# Patient Record
Sex: MALE | Employment: OTHER | URBAN - METROPOLITAN AREA
[De-identification: names, ages, dates, MRNs, and addresses within clinical notes are randomized per-mention and may not be internally consistent; named-entity substitution may affect disease eponyms.]

---

## 2017-04-25 ENCOUNTER — OFFICE VISIT (OUTPATIENT)
Dept: FAMILY MEDICINE | Facility: CLINIC | Age: 62
End: 2017-04-25
Payer: COMMERCIAL

## 2017-04-25 DIAGNOSIS — Z85.828 HISTORY OF SKIN CANCER: ICD-10-CM

## 2017-04-25 DIAGNOSIS — L81.4 SOLAR LENTIGO: ICD-10-CM

## 2017-04-25 DIAGNOSIS — Z12.83 SKIN CANCER SCREENING: Primary | ICD-10-CM

## 2017-04-25 DIAGNOSIS — D22.9 MULTIPLE BENIGN MELANOCYTIC NEVI: ICD-10-CM

## 2017-04-25 DIAGNOSIS — L57.0 AK (ACTINIC KERATOSIS): ICD-10-CM

## 2017-04-25 PROCEDURE — 99213 OFFICE O/P EST LOW 20 MIN: CPT | Mod: 25 | Performed by: FAMILY MEDICINE

## 2017-04-25 PROCEDURE — 17000 DESTRUCT PREMALG LESION: CPT | Performed by: FAMILY MEDICINE

## 2017-04-25 PROCEDURE — 17003 DESTRUCT PREMALG LES 2-14: CPT | Performed by: FAMILY MEDICINE

## 2017-04-25 NOTE — MR AVS SNAPSHOT
"              After Visit Summary   4/25/2017    Jose Henderson    MRN: [id number]           Patient Information     Date Of Birth          1955        Visit Information        Provider Department      4/25/2017 12:20 PM Katrina Husain MD [facility]        Today's Diagnoses     Skin cancer screening    -  1    AK (actinic keratosis)        History of skin cancer        Solar lentigo        Multiple benign melanocytic nevi          Care Instructions    FUTURE APPOINTMENTS  Follow up every 1 year(s) for a full-body skin cancer screening.    TOPICAL STEROID INSTRUCTIONS  Hydrocortisone 1% cream.    Apply an amount equal to half of a fingertip (0.25 g) to the affected itchy area(s) on the insides of ears, two times per day for 3-5 days.    \"Fingertip Amount\"      This is a weak strength steroid, and it can be used on the ears.    Topical steroid use is for short-term treatment only. If after initial treatment, you are using this for prolonged periods of time, return to clinic for re-evaluation of treatment.    Keep in mind to also regularly use moisturizer, as this preventative measure can help maintain your skin's natural moisture barrier.    TIPS FOR AVOIDING SKIN CANCER AND PREMATURE SKIN AGING  DOs    Wear a wide-brimmed hat and sunglasses.     Wear sun-protective clothing.    [employer] and other [employer] make sun protective clothing that is stylish, comfortable and cool.    [employer]ets [employer] family [employer] and other [employer] make UV arm sleeves suitable for golfing, gardening and other activities.      Wear sunscreen on your face every day, even in the winter. (UVA \"aging rays\" penetrates window glass and is just as strong in the winter as in the summer) Sunscreen with SPF > 30 is recommended.    Wear sunscreen on your body and re-apply every 2 hours when exposed to sun. Sunscreen with SPF > 50 is recommended.    You should use about 3 tablespoons of sunscreen to protect your " "whole body. Thus a typical eight ounce bottle of sunscreen should last 4 applications. Remember, that the SPF rating is compromised if you don t apply enough. Most people only apply 1/2 - 1/3 of the amount they need. Also don t forget areas such as your ears, feet, upper back and harder to reach places. Keep in mind that these amounts should be increased for larger body sizes.    Note that spray sunscreens are only for touch-up application, not as a base layer. Also, use spray sunscreens with caution around small children due to inhalation risk.    Product Recommendations:    Look for broad spectrum sunscreen (blocks both UVA and UVB).    Look for titanium dioxide and/or zinc oxide in the active ingredients, which are physical blockers as opposed to chemical blockers. Chemical-free sunscreens should not irritate the skin.    Good examples include: Blue Lizard, EltaMD, Vanicream, Solbar, CeraVe.     For sensitive skin, consider : SkinMedica Essential Defense Mineral Shield Broad Spectrum SPF 35      Avoid combination products that include both sunscreen and insect repellant, as sunscreen should be applied every 2 hours, but insect repellant should not be applied as frequently.    Avoid products that include oxybenzone or retinyl palmitate.    For more information:  http://www.skincancer.org/prevention/sun-protection/sunscreen/sunscreens-safe-and-effective    DON'Ts    All tanning damages the skin. Aim for ivory skin year round and you will have less trouble with your skin in years to come. There is no merit in getting \"a base tan\" before a warm weather vacation, as any tanning indicates your body's response to sun damage.    Never use tanning beds. Tanning beds are associated with much higher risks of skin cancer.    Avoid mid-day sunshine (10 AM to 3 PM), if possible.    Stop smoking. Smokers have higher rates of skin cancer and also have premature skin wrinkling.    SKIN CANCER SELF-EXAM INSTRUCTIONS  Check every " month in the mirror or with a household member. Be aware of any changes, especially bleeding or tenderness. Also, make sure to check your nails for color changes after removal of nail polish.    For melanoma, check for:  A - Asymmetry. One half unlike the other half.  B - Border. Irregular, scalloped, ragged, notched, blurred or poorly defined borders.  C - Color. Color variations from one area to another, with shades of tan, brown and/or black present. Sometimes white, red or blue.  D - Diameter. Greater than 6 mm (about the size of a pencil eraser). Any new growth of a mole should be concerning and be evaluated.  E - Evolving. A mole or skin lesion that looks different from the rest or is changing in size, shape or color.    For basal cell carcinoma and squamous cell carcinoma, check for:    Sores, shiny bumps, nodules, scaly lesions, or wart-like growths that are itchy, tender, crusting, scabbing, eroding, oozing or bleeding.    Open sores/wounds or reddish/irritated areas that do not heal within 2-3 weeks.    Scar-like areas that are white, yellow or waxy in color.    CRYOTHERAPY FOR ACTINIC KERATOSES POST-TREATMENT CARE INSTRUCTIONS  Actinic keratoses are benign, scaly or gritty lesions that appear in sun-exposed areas and may progress to skin cancers. For this reason, it is important to treat them before they become cancerous.  Liquid nitrogen is mildly uncomfortable when applied to the skin, but the discomfort rapidly subsides.    Post-Treatment:  You may experience burning and/or stinging immediately following the procedure. The discomfort from the procedure may persist over the next 12-24 hours. The area treated will look pinker and slightly swollen before the healing process begins. You may also notice redness, swelling, tenderness, weeping and crusts or scabs.    Blister - You may or may notice blistering from the freezing. If you develop an uncomfortable blister from today's treatment, you may gently  puncture this with a needle that has been cleaned with alcohol. However, do not remove the protective skin layer of the blister.    Scab - After a few days, you may notice scaliness or scab formation. Do not pick at the scabs because this may cause slower healing and a permanent scar.    The skin may appear temporarily darker at the treatment site, but this usually fades over a period of months, provided that the area is protected from the sun.    Care of the areas treated:    Wash the area with a mild cleanser.    Gently pat dry.    Do not rub.     Keep protected from the sun during the healing process and for a full year following treatment as the skin continues to remodel during this time.    Apply Vaseline or Aquaphor ointment sparingly to the site for the first 7 days after treatment.    Do not use Neosporin, as many people eventually develop a medication allergy, that can easily be confused with an infection, to Neomycin.    Return if:  There should not be any residual scaling. If there is any concern that the lesion has persisted after 4-6 weeks, make an appointment for a re-check. Healing time does vary depending on your individual healing process and the area of the body treated. Most patients will be healed in one month.    Signs of Infection:  Thankfully this is rare. However if you notice persistent colored drainage, increasing pain, fever or other signs of infection, please call us at: [phone]        Follow-ups after your visit        Your next 10 appointments already scheduled     Apr 25, 2017 12:20 PM CDT   Office Visit with Katrina Husain MD   [facility] ([facility] )    [address] [place] [address] [id number] [phone]           Bring a current list of meds and any records pertaining to this visit.  For Physicals, please bring immunization records and any forms needing to be filled out.  Please arrive 10  "minutes early to complete paperwork.              Who to contact     If you have questions or need follow up information about today's clinic visit or your schedule please contact [facility] directly at [phone].  Normal or non-critical lab and imaging results will be communicated to you by MyChart, letter or phone within 4 business days after the clinic has received the results. If you do not hear from us within 7 days, please contact the clinic through MyChart or phone. If you have a critical or abnormal lab result, we will notify you by phone as soon as possible.  Submit refill requests through [employer] or call your pharmacy and they will forward the refill request to us. Please allow 3 business days for your refill to be completed.          Additional Information About Your Visit        [employer] Information     [employer] lets you send messages to your doctor, view your test results, renew your prescriptions, schedule appointments and more. To sign up, go to [email or web address] . Click on \"Log in\" on the left side of the screen, which will take you to the Welcome page. Then click on \"Sign up Now\" on the right side of the page.     You will be asked to enter the access code listed below, as well as some personal information. Please follow the directions to create your username and password.     Your access code is: [email or web address]  Expires: 2017 12:12 PM     Your access code will  in 90 days. If you need help or a new code, please call your [facility] clinic or [phone].        Care EveryWhere ID     This is your Care EveryWhere ID. This could be used by other organizations to access your [facility] medical records  [id number]         Blood Pressure from Last 3 Encounters:   No data found for BP    Weight from Last 3 Encounters:   No data found for Wt              Today, you had the following     No orders found for display       Primary Care Provider    None Specified       " No primary provider on file.        Thank you!     Thank you for choosing [facility]  for your care. Our goal is always to provide you with excellent care. Hearing back from our patients is one way we can continue to improve our services. Please take a few minutes to complete the written survey that you may receive in the mail after your visit with us. Thank you!             Your Updated Medication List - Protect others around you: Learn how to safely use, store and throw away your medicines at www.disposemymeds.org.      Notice  As of 4/25/2017 12:12 PM    You have not been prescribed any medications.

## 2017-04-25 NOTE — PATIENT INSTRUCTIONS
"FUTURE APPOINTMENTS  Follow up every 1 year(s) for a full-body skin cancer screening.    TOPICAL STEROID INSTRUCTIONS  Hydrocortisone 1% cream.    Apply an amount equal to half of a fingertip (0.25 g) to the affected itchy area(s) on the insides of ears, two times per day for 3-5 days.    \"Fingertip Amount\"      This is a weak strength steroid, and it can be used on the ears.    Topical steroid use is for short-term treatment only. If after initial treatment, you are using this for prolonged periods of time, return to clinic for re-evaluation of treatment.    Keep in mind to also regularly use moisturizer, as this preventative measure can help maintain your skin's natural moisture barrier.    TIPS FOR AVOIDING SKIN CANCER AND PREMATURE SKIN AGING  DOs    Wear a wide-brimmed hat and sunglasses.     Wear sun-protective clothing.    [employer] and other [employer] make sun protective clothing that is stylish, comfortable and cool.    [employer] and other [employer] make UV arm sleeves suitable for golfing, gardening and other activities.      Wear sunscreen on your face every day, even in the winter. (UVA \"aging rays\" penetrates window glass and is just as strong in the winter as in the summer) Sunscreen with SPF > 30 is recommended.    Wear sunscreen on your body and re-apply every 2 hours when exposed to sun. Sunscreen with SPF > 50 is recommended.    You should use about 3 tablespoons of sunscreen to protect your whole body. Thus a typical eight ounce bottle of sunscreen should last 4 applications. Remember, that the SPF rating is compromised if you don t apply enough. Most people only apply 1/2 - 1/3 of the amount they need. Also don t forget areas such as your ears, feet, upper back and harder to reach places. Keep in mind that these amounts should be increased for larger body sizes.    Note that spray sunscreens are only for touch-up application, not as a base layer. Also, use spray sunscreens with " "caution around small children due to inhalation risk.    Product Recommendations:    Look for broad spectrum sunscreen (blocks both UVA and UVB).    Look for titanium dioxide and/or zinc oxide in the active ingredients, which are physical blockers as opposed to chemical blockers. Chemical-free sunscreens should not irritate the skin.    Good examples include: Blue Lizard, EltaMD, Vanicream, Solbar, CeraVe.     For sensitive skin, consider : SkinMedica Essential Defense Mineral Shield Broad Spectrum SPF 35      Avoid combination products that include both sunscreen and insect repellant, as sunscreen should be applied every 2 hours, but insect repellant should not be applied as frequently.    Avoid products that include oxybenzone or retinyl palmitate.    For more information:  http://www.skincancer.org/prevention/sun-protection/sunscreen/sunscreens-safe-and-effective    DON'Ts    All tanning damages the skin. Aim for ivory skin year round and you will have less trouble with your skin in years to come. There is no merit in getting \"a base tan\" before a warm weather vacation, as any tanning indicates your body's response to sun damage.    Never use tanning beds. Tanning beds are associated with much higher risks of skin cancer.    Avoid mid-day sunshine (10 AM to 3 PM), if possible.    Stop smoking. Smokers have higher rates of skin cancer and also have premature skin wrinkling.    SKIN CANCER SELF-EXAM INSTRUCTIONS  Check every month in the mirror or with a household member. Be aware of any changes, especially bleeding or tenderness. Also, make sure to check your nails for color changes after removal of nail polish.    For melanoma, check for:  A - Asymmetry. One half unlike the other half.  B - Border. Irregular, scalloped, ragged, notched, blurred or poorly defined borders.  C - Color. Color variations from one area to another, with shades of tan, brown and/or black present. Sometimes white, red or blue.  D - " Diameter. Greater than 6 mm (about the size of a pencil eraser). Any new growth of a mole should be concerning and be evaluated.  E - Evolving. A mole or skin lesion that looks different from the rest or is changing in size, shape or color.    For basal cell carcinoma and squamous cell carcinoma, check for:    Sores, shiny bumps, nodules, scaly lesions, or wart-like growths that are itchy, tender, crusting, scabbing, eroding, oozing or bleeding.    Open sores/wounds or reddish/irritated areas that do not heal within 2-3 weeks.    Scar-like areas that are white, yellow or waxy in color.    CRYOTHERAPY FOR ACTINIC KERATOSES POST-TREATMENT CARE INSTRUCTIONS  Actinic keratoses are benign, scaly or gritty lesions that appear in sun-exposed areas and may progress to skin cancers. For this reason, it is important to treat them before they become cancerous.  Liquid nitrogen is mildly uncomfortable when applied to the skin, but the discomfort rapidly subsides.    Post-Treatment:  You may experience burning and/or stinging immediately following the procedure. The discomfort from the procedure may persist over the next 12-24 hours. The area treated will look pinker and slightly swollen before the healing process begins. You may also notice redness, swelling, tenderness, weeping and crusts or scabs.    Blister - You may or may notice blistering from the freezing. If you develop an uncomfortable blister from today's treatment, you may gently puncture this with a needle that has been cleaned with alcohol. However, do not remove the protective skin layer of the blister.    Scab - After a few days, you may notice scaliness or scab formation. Do not pick at the scabs because this may cause slower healing and a permanent scar.    The skin may appear temporarily darker at the treatment site, but this usually fades over a period of months, provided that the area is protected from the sun.    Care of the areas treated:    Wash the area  with a mild cleanser.    Gently pat dry.    Do not rub.     Keep protected from the sun during the healing process and for a full year following treatment as the skin continues to remodel during this time.    Apply Vaseline or Aquaphor ointment sparingly to the site for the first 7 days after treatment.    Do not use Neosporin, as many people eventually develop a medication allergy, that can easily be confused with an infection, to Neomycin.    Return if:  There should not be any residual scaling. If there is any concern that the lesion has persisted after 4-6 weeks, make an appointment for a re-check. Healing time does vary depending on your individual healing process and the area of the body treated. Most patients will be healed in one month.    Signs of Infection:  Thankfully this is rare. However if you notice persistent colored drainage, increasing pain, fever or other signs of infection, please call us at: [phone]

## 2017-04-25 NOTE — PROGRESS NOTES
[facility] - PRIMARY CARE SKIN    CC : skin cancer screening (full-body)  SUBJECTIVE:                                                    Jose Henderson is a 61 year old male who presents to clinic today for a full-body skin exam because of his history of skin cancer.    Bothersome lesions noticed by the patient or other skin concerns :  Issue One : lesion on temple areas of face.  Onset : within the last 4 years.  Enlarging : YES.  Bleeding : NO  Itchy or irritating : YES.  Pain or tenderness : NO.  Changing color : NO.  Issue Two : Lesion on nose.  Onset : 1 year.  Enlarging : YES.  Bleeding : NO  Itchy or irritating : NO.  Pain or tenderness : NO.  Changing color : NO.    Personal history of other skin cancer : YES - squamous cell carcinoma, other skin cancers on face.  Family history of skin cancer : NO.    Sun Exposure History  Previous history of significant sun exposure:  Blistering sunburns : YES - 1-2 times  Tanning beds : YES - when younger.  Sunscreen Use : YES.    Occupation : retired  (both indoor & outdoor).    Refer to electronic medical record (EMR) for past medical history and medications.    INTEGUMENTARY/SKIN: POSITIVE for changing lesions  ROS : 14 point review of systems was negative except the symptoms listed above in the HPI.    This document serves as a record of the services and decisions personally performed and made by Prachi Husain MD. It was created on her behalf by Ryland Connelly, a trained medical scribe.  The creation of this document is based on the scribe's personal observations and the provider's statements to the medical scribe.  Ryland Connelly, April 25, 2017 12:03 PM      OBJECTIVE:                                                    GENERAL: healthy, alert and no distress  SKIN: Betts Skin Type - II.  This patient was examined from the top of the head to the bottom of the feet  including scalp, face, neck, back, chest, breasts, buttocks, both arms, both legs,  both hands, both feet, all 10 fingers in all 10 toes. The dermatoscope was used to help evaluate pigmented lesions.  Skin Pertinent Findings:  Face : Scattered, brown, macule(s) most consistent with benign solar lentigo.    Chest, Abdomen : Multiple, slightly raised, red lesion(s) consistent with capillary hemangioma.    Back : Multiple, scattered, 2 mm - 6 mm in size, brown macules most consistent with benign nevi (melanocytic nevi).    Significant Findings:  Left lateral forehead : Well-healed scar.    Face : 5  mm in size, erythematous, scaly, non-indurated lesion(s) most consistent with actinic keratoses.  Name : Liquid Nitrogen Cry-Ac Cryotherapy.  Indication : Pre-malignant lesion.  Location(s) : left lateral cheek - x1, left lateral forehead - x1, right lateral face - x1.  Completed by : Prachi Husain MD  Note : Discussed natural history of lesion and treatment options. Prior to treatment, we discussed inflammation, tenderness post-procedure, the healing process, and the risks of pain, infection, scarring, blistering, and hypo-/hyperpigmentation after healing. Explained that these lesions may grow back and may need additional treatment or re-treatment. The patient expressed a desire to proceed with cryotherapy.    The lesion(s) was treated with liquid nitrogen Cry-Ac, five second freeze repeated twice with a pause to allow for the area to thaw. If this lesion should recur, then it needs to be re-evaluated.    Patient tolerated the procedure well and left in good condition.  Total number of lesions treated : 3.    Diagnostic Test Results:  none           ASSESSMENT:                                                      Encounter Diagnoses   Name Primary?     Skin cancer screening Yes     AK (actinic keratosis)      History of skin cancer      Solar lentigo      Multiple benign melanocytic nevi          PLAN:                                                    Patient Instructions   FUTURE APPOINTMENTS  Follow up  "every 1 year(s) for a full-body skin cancer screening.    TOPICAL STEROID INSTRUCTIONS  Hydrocortisone 1% cream.    Apply an amount equal to half of a fingertip (0.25 g) to the affected itchy area(s) on the insides of ears, two times per day for 3-5 days.    \"Fingertip Amount\"      This is a weak strength steroid, and it can be used on the ears.    Topical steroid use is for short-term treatment only. If after initial treatment, you are using this for prolonged periods of time, return to clinic for re-evaluation of treatment.    Keep in mind to also regularly use moisturizer, as this preventative measure can help maintain your skin's natural moisture barrier.    TIPS FOR AVOIDING SKIN CANCER AND PREMATURE SKIN AGING  DOs    Wear a wide-brimmed hat and sunglasses.     Wear sun-protective clothing.    [employer] and other [employer] make sun protective clothing that is stylish, comfortable and cool.    [employer] and other [employer] make UV arm sleeves suitable for golfing, gardening and other activities.      Wear sunscreen on your face every day, even in the winter. (UVA \"aging rays\" penetrates window glass and is just as strong in the winter as in the summer) Sunscreen with SPF > 30 is recommended.    Wear sunscreen on your body and re-apply every 2 hours when exposed to sun. Sunscreen with SPF > 50 is recommended.    You should use about 3 tablespoons of sunscreen to protect your whole body. Thus a typical eight ounce bottle of sunscreen should last 4 applications. Remember, that the SPF rating is compromised if you don t apply enough. Most people only apply 1/2 - 1/3 of the amount they need. Also don t forget areas such as your ears, feet, upper back and harder to reach places. Keep in mind that these amounts should be increased for larger body sizes.    Note that spray sunscreens are only for touch-up application, not as a base layer. Also, use spray sunscreens with caution around small children due " "to inhalation risk.    Product Recommendations:    Look for broad spectrum sunscreen (blocks both UVA and UVB).    Look for titanium dioxide and/or zinc oxide in the active ingredients, which are physical blockers as opposed to chemical blockers. Chemical-free sunscreens should not irritate the skin.    Good examples include: Blue Lizard, EltaMD, Vanicream, Solbar, CeraVe.     For sensitive skin, consider : Skin[employer] Essential Defense Mineral Shield Broad Spectrum SPF 35      Avoid combination products that include both sunscreen and insect repellant, as sunscreen should be applied every 2 hours, but insect repellant should not be applied as frequently.    Avoid products that include oxybenzone or retinyl palmitate.    For more information:  http://www.skincancer.org/prevention/sun-protection/sunscreen/sunscreens-safe-and-effective    DON'Ts    All tanning damages the skin. Aim for ivory skin year round and you will have less trouble with your skin in years to come. There is no merit in getting \"a base tan\" before a warm weather vacation, as any tanning indicates your body's response to sun damage.    Never use tanning beds. Tanning beds are associated with much higher risks of skin cancer.    Avoid mid-day sunshine (10 AM to 3 PM), if possible.    Stop smoking. Smokers have higher rates of skin cancer and also have premature skin wrinkling.    SKIN CANCER SELF-EXAM INSTRUCTIONS  Check every month in the mirror or with a household member. Be aware of any changes, especially bleeding or tenderness. Also, make sure to check your nails for color changes after removal of nail polish.    For melanoma, check for:  A - Asymmetry. One half unlike the other half.  B - Border. Irregular, scalloped, ragged, notched, blurred or poorly defined borders.  C - Color. Color variations from one area to another, with shades of tan, brown and/or black present. Sometimes white, red or blue.  D - Diameter. Greater than 6 mm (about the " size of a pencil eraser). Any new growth of a mole should be concerning and be evaluated.  E - Evolving. A mole or skin lesion that looks different from the rest or is changing in size, shape or color.    For basal cell carcinoma and squamous cell carcinoma, check for:    Sores, shiny bumps, nodules, scaly lesions, or wart-like growths that are itchy, tender, crusting, scabbing, eroding, oozing or bleeding.    Open sores/wounds or reddish/irritated areas that do not heal within 2-3 weeks.    Scar-like areas that are white, yellow or waxy in color.    CRYOTHERAPY FOR ACTINIC KERATOSES POST-TREATMENT CARE INSTRUCTIONS  Actinic keratoses are benign, scaly or gritty lesions that appear in sun-exposed areas and may progress to skin cancers. For this reason, it is important to treat them before they become cancerous.  Liquid nitrogen is mildly uncomfortable when applied to the skin, but the discomfort rapidly subsides.    Post-Treatment:  You may experience burning and/or stinging immediately following the procedure. The discomfort from the procedure may persist over the next 12-24 hours. The area treated will look pinker and slightly swollen before the healing process begins. You may also notice redness, swelling, tenderness, weeping and crusts or scabs.    Blister - You may or may notice blistering from the freezing. If you develop an uncomfortable blister from today's treatment, you may gently puncture this with a needle that has been cleaned with alcohol. However, do not remove the protective skin layer of the blister.    Scab - After a few days, you may notice scaliness or scab formation. Do not pick at the scabs because this may cause slower healing and a permanent scar.    The skin may appear temporarily darker at the treatment site, but this usually fades over a period of months, provided that the area is protected from the sun.    Care of the areas treated:    Wash the area with a mild cleanser.    Gently pat  dry.    Do not rub.     Keep protected from the sun during the healing process and for a full year following treatment as the skin continues to remodel during this time.    Apply Vaseline or Aquaphor ointment sparingly to the site for the first 7 days after treatment.    Do not use Neosporin, as many people eventually develop a medication allergy, that can easily be confused with an infection, to Neomycin.    Return if:  There should not be any residual scaling. If there is any concern that the lesion has persisted after 4-6 weeks, make an appointment for a re-check. Healing time does vary depending on your individual healing process and the area of the body treated. Most patients will be healed in one month.    Signs of Infection:  Thankfully this is rare. However if you notice persistent colored drainage, increasing pain, fever or other signs of infection, please call us at: [phone]    The patient was counseled about sunscreens and sun avoidance. The patient was counseled to check the skin regularly and report any lesion that is new, changing, itching, scabbing, bleeding or otherwise bothersome. The patient was discharged ambulatory and in stable condition.    The patient was instructed to schedule full-body skin exam for skin cancer every 1 year.      PROCEDURES:                                                    None.    TT : 25 minutes.  CT : 15 minutes.      The information in this document, created by the medical scribe for me, accurately reflects the services I personally performed and the decisions made by me. I have reviewed and approved this document for accuracy prior to leaving the patient care area.  Prachi Husain MD April 25, 2017 12:03 PM  [facility] - PRIMARY CARE SKIN

## 2019-05-07 ENCOUNTER — OFFICE VISIT (OUTPATIENT)
Dept: FAMILY MEDICINE | Facility: CLINIC | Age: 64
End: 2019-05-07
Payer: COMMERCIAL

## 2019-05-07 VITALS — SYSTOLIC BLOOD PRESSURE: 128 MMHG | DIASTOLIC BLOOD PRESSURE: 62 MMHG

## 2019-05-07 DIAGNOSIS — Z85.828 HISTORY OF SKIN CANCER: ICD-10-CM

## 2019-05-07 DIAGNOSIS — D22.9 MULTIPLE BENIGN MELANOCYTIC NEVI: ICD-10-CM

## 2019-05-07 DIAGNOSIS — Z12.83 SKIN CANCER SCREENING: Primary | ICD-10-CM

## 2019-05-07 DIAGNOSIS — D48.5 NEOPLASM OF UNCERTAIN BEHAVIOR OF SKIN: ICD-10-CM

## 2019-05-07 DIAGNOSIS — L82.1 SEBORRHEIC KERATOSES: ICD-10-CM

## 2019-05-07 DIAGNOSIS — L81.4 SOLAR LENTIGO: ICD-10-CM

## 2019-05-07 DIAGNOSIS — L57.0 AK (ACTINIC KERATOSIS): ICD-10-CM

## 2019-05-07 PROCEDURE — 17000 DESTRUCT PREMALG LESION: CPT | Mod: 59 | Performed by: FAMILY MEDICINE

## 2019-05-07 PROCEDURE — 11301 SHAVE SKIN LESION 0.6-1.0 CM: CPT | Performed by: FAMILY MEDICINE

## 2019-05-07 PROCEDURE — 11301 SHAVE SKIN LESION 0.6-1.0 CM: CPT | Mod: 59 | Performed by: FAMILY MEDICINE

## 2019-05-07 PROCEDURE — 11300 SHAVE SKIN LESION 0.5 CM/<: CPT | Mod: 59 | Performed by: FAMILY MEDICINE

## 2019-05-07 PROCEDURE — 99214 OFFICE O/P EST MOD 30 MIN: CPT | Mod: 25 | Performed by: FAMILY MEDICINE

## 2019-05-07 PROCEDURE — 88342 IMHCHEM/IMCYTCHM 1ST ANTB: CPT | Mod: TC | Performed by: FAMILY MEDICINE

## 2019-05-07 PROCEDURE — 11300 SHAVE SKIN LESION 0.5 CM/<: CPT | Mod: 51 | Performed by: FAMILY MEDICINE

## 2019-05-07 PROCEDURE — 88305 TISSUE EXAM BY PATHOLOGIST: CPT | Mod: TC | Performed by: FAMILY MEDICINE

## 2019-05-07 RX ORDER — GABAPENTIN 300 MG/1
CAPSULE ORAL
COMMUNITY
Start: 2019-04-22

## 2019-05-07 RX ORDER — CIPROFLOXACIN AND DEXAMETHASONE 3; 1 MG/ML; MG/ML
SUSPENSION/ DROPS AURICULAR (OTIC)
COMMUNITY
Start: 2019-04-22

## 2019-05-07 RX ORDER — SILDENAFIL 100 MG/1
TABLET, FILM COATED ORAL
COMMUNITY
Start: 2019-01-22

## 2019-05-07 RX ORDER — SERTRALINE HYDROCHLORIDE 100 MG/1
TABLET, FILM COATED ORAL
COMMUNITY
Start: 2019-04-02

## 2019-05-07 RX ORDER — LISINOPRIL 10 MG/1
TABLET ORAL
COMMUNITY
Start: 2019-04-29

## 2019-05-07 RX ORDER — GLIMEPIRIDE 4 MG/1
TABLET ORAL
COMMUNITY
Start: 2019-03-08

## 2019-05-07 RX ORDER — ATORVASTATIN CALCIUM 80 MG/1
TABLET, FILM COATED ORAL
COMMUNITY
Start: 2019-04-29

## 2019-05-07 NOTE — LETTER
5/7/2019         RE: Jose Henderson  [address] [facility] [address]        Dear Colleague,    Thank you for referring your patient, Jose Henderson, to the [facility]. Please see a copy of my visit note below.    [facility] - PRIMARY CARE SKIN    CC: skin cancer screening (full-body)  SUBJECTIVE:   Jose Henderson is a(n) 64 year old male who presents to clinic today for a full-body skin exam.    Bothersome lesions noticed by the patient or other skin concerns :  Issue One: His primary care provider has noticed an abnormally colored and sized lesion on the back.    Issue Two: A brown spot on the left face becomes scaly and itchy.    Personal Medical History  Skin cancer: YES - squamous cell carcinoma, other skin cancers on face    Family Medical History  Skin cancer: NO    Sun Exposure History  Previous history of significant sun exposure:  Blistering sunburns: YES - 1-2 times.  Tanning beds: YES - when younger.  Sunscreen usage: YES.    Occupation: retired,  (both indoor & outdoor).    Refer to electronic medical record (EMR) for past medical history and medications.    INTEGUMENTARY/SKIN: POSITIVE for changing lesion  ROS: 14 point review of systems was negative except the symptoms listed above in the HPI.    This document serves as a record of the services and decisions personally performed and made by Katrina Husain MD and was created by Ryland Connelly, a trained medical scribe, based on personal observations and provider statements to the medical scribe.  May 7, 2019 10:57 AM   Ryland Connelly    OBJECTIVE:   GENERAL: healthy, alert and no distress.  LYMPH: no cervical, supraclavicular, axillary, or inguinal adenopathy  SKIN: Betts Skin Type - II.  This patient was examined from the top of the head to the bottom of the feet  including scalp, face, neck, trunk, buttocks, both arms, both legs, both hands, both feet, and all fingers and toes. The  dermatoscope was used to help evaluate pigmented lesions.  Skin Pertinent Findings:  Scalp, vertex: Two 4 mm in size smooth flesh-colored flat-topped benign-appearing lesion.    Left ala nasi and left mid-cheek: brown, macule(s) most consistent with benign solar lentigo.    Upper extremities, Trunk: Multiple brown macules of various sizes and shapes most consistent with (benign) melanocytic nevi.    Abdomen: slightly raised, red lesion(s) consistent with capillary hemangioma. stuck-on appearing papules, raised, brown, coarse-textured, round lesion(s) most consistent with seborrheic keratoses.    Significant Findings:  Right shoulder, 5 cm lateral to the AC joint: 4 mm in size irregularly pigmented brown lesion. ? Atypical nevus ? Other     Left lateral abdomen: 6 mm x 3 mm in size irregularly pigmented brown macule. ? Atypical nevus ? Other    Back, 5 cm right of L1: 3 mm in size irregular darkly pigmented lesion. ? Atypical nevus ? Other     Back, at T10, 15 mm left of midline: 9 mm x 5 mm in size flesh-colored slightly raised lesion with surrounding irregular pigmentation. ? Atypical nevus ? Other    Left mid-cheek: 8  mm in size, erythematous, scaly, non-indurated lesion(s) most consistent with actinic keratoses.  Name: Liquid Nitrogen Cry-Ac Cryotherapy.  Indication: Pre-malignant lesion.  Location(s): left mid-cheek - x1.  Completed by: Prachi Husain MD.  Note : Discussed natural history of lesion and treatment options. Prior to treatment, we discussed inflammation, tenderness post-procedure, the healing process, and the risks of pain, infection, scarring, blistering, and hypo-/hyperpigmentation after healing. Explained that these lesions may grow back and may need additional treatment or re-treatment. The patient expressed a desire to proceed with cryotherapy.    The lesion(s) was treated with liquid nitrogen Cry-Ac, five second freeze repeated twice with a pause to allow for the area to thaw.    Patient  "tolerated the procedure well and left in good condition. If this lesion should persist or recur, then it needs to be re-evaluated.  Total number of lesions treated: 1.    ASSESSMENT:     Encounter Diagnoses   Name Primary?     Skin cancer screening Yes     History of skin cancer      Solar lentigo      AK (actinic keratosis)      Neoplasm of uncertain behavior of skin      Seborrheic keratoses      Multiple benign melanocytic nevi          PLAN:   Patient Instructions   FUTURE APPOINTMENTS  Follow up in 1 year(s) for a full-body skin cancer screening.  Follow up per pathology report.    WOUND CARE INSTRUCTIONS  1. Wash hands before every dressing change.  2. After 24 hours, change dressing daily.  3. Wash the wound area with a mild soap, then rinse.  4. Gently pat dry with a sterile gauze or Q-tip.  5. Using a Q-tip, apply Vaseline or Aquaphor only over entire wound. Do NOT use Neosporin - as many people react to neomycin.  6. Finally, cover with a bandage or sterile non-stick gauze with micropore paper tape.  7. Repeat once daily until wound has healed.      Soap, water and shampoo will not hurt this area.    Do not go swimming or take baths, but showering is encouraged.    Limit use of the area where the procedure was done for a few days to allow for optimal healing.    If you experience bleeding:  Wash hands and hold firm pressure on the area for 10 minutes without checking to see if the bleeding has stopped. \"Checking\" pulls off the protective wound clot and restarts the bleeding all over again. Re-apply pressure for 10 minutes if necessary to stop bleeding.  Use additional sterile gauze and tape to maintain pressure once bleeding has stopped.  If bleeding continues, then call back to clinic at [phone].    Signs of Infection:  Infection can occur in any area where skin has been disrupted.  If you notice persistent redness, swelling, colored drainage, increasing pain, fever or other signs of infection, " please call us at: [phone] and ask to have me or my colleague paged. We will call you back to discuss.    Pathology Results:  You will be notified, generally via letter or MyChart, in approximately 10 days. If there is anything we need to discuss or further treatment needed, I will call you to discuss it.    PATIENT INFORMATION : WOUNDS  During the healing process you will notice a number of changes. All wounds develop a small halo of redness surrounding the wound.  This means healing is occurring. Severe itching with extensive redness usually indicates sensitivity to the ointment or bandage tape used to dress the wound.  You should call our office if this develops.      Swelling  and/or discoloration around your surgical site is common, particularly when performed around the eye.    All wounds normally drain.  The larger the wound the more drainage there will be.  After 7-10 days, you will notice the wound beginning to shrink and new skin will begin to grow.  The wound is healed when you can see skin has formed over the entire area.  A healed wound has a healthy, shiny look to the surface and is red to dark pink in color to normalize.  Wounds may take approximately 4-6 weeks to heal.  Larger wounds may take 6-8 weeks. After the wound is healed you may discontinue dressing changes.    You may experience a sensation of tightness as your wound heals. This is normal and will gradually subside.    Your healed wound may be sensitive to temperature changes. This sensitivity improves with time, but if you re having a lot of discomfort, try to avoid temperature extremes.    Patients frequently experience itching after their wound appears to have healed because of the continue healing under the skin.  Plain Vaseline will help relieve the itching.    ACTINIC KERATOSES POST-TREATMENT CARE INSTRUCTIONS  Actinic keratoses are benign, scaly or gritty lesions that appear in sun-exposed areas and may progress to skin  cancers. For this reason, it is important to treat them before they become cancerous. Liquid nitrogen is the most commonly used and most effective treatment for actinic keratoses; it is mildly uncomfortable when applied to the skin, but the discomfort rapidly subsides.    Post-Treatment:  You may experience burning and/or stinging immediately following the procedure. The discomfort from the procedure may persist over the next 12-24 hours. The area treated will look pinker and slightly swollen before the healing process begins. You may also notice redness, swelling, tenderness, weeping and crusts or scabs. Healing time is approximately 10-14 days.    Blister - You may or may notice blistering from the freezing. If you develop an uncomfortable blister from today's treatment, you may gently puncture this with a needle that has been cleaned with alcohol. However, do not remove the protective skin layer of the blister.    Scab - After a few days, you may notice scaliness or scab formation. Do not pick at the scabs because this may cause slower healing and a permanent scar.    The skin may appear temporarily darker at the treatment site, but this usually fades over a period of months, provided that the area is protected from the sun.    Care of the areas treated:    Wash the area with a mild cleanser.    Gently pat dry.    Do not rub.     Keep protected from the sun during the healing process and for a full year following treatment as the skin continues to remodel during this time.    Apply Vaseline or Aquaphor ointment sparingly to the site for the first 7 days after treatment.    Do not use Neosporin, as many people eventually develop a medication allergy, that can easily be confused with an infection, to Neomycin.    Return if:  There should not be any residual scaling. If there is any concern that the lesion has persisted after 4-6 weeks, make an appointment for a re-check. Healing time does vary depending on your  "individual healing process and the area of the body treated. Most patients will be healed in one month.    Signs of Infection:  Thankfully this is rare. However if you notice persistent colored drainage, increasing pain, fever or other signs of infection, please call us at: [phone]    SUN PROTECTION INSTRUCTIONS  Sun damage can lead to skin cancer and premature aging of the skin.      The best way to protect from sun damage to your skin is to avoid the sun during peak hours (10 am - 2 pm) even on overcast days.    Never use tanning beds. Tanning beds are associated with much higher risks of skin cancer.    All tanning damages the skin. Aim for ivory skin year round and you will have less trouble with your skin in years to come. There is no merit in getting \"a base tan\" before a warm weather vacation, as any tanning indicates your body's response to sun damage.    Stop smoking. Smokers have higher rates of skin cancer and also have premature skin wrinkling.    Use UPF sun-protective clothing, which while more expensive initially provides longer lasting coverage without having to worry about remembering to re-apply.  1. Wear a wide-brimmed hat and sunglasses.   2. Wear sun-protective clothing.  [employer]Rocket and other [employer] make sun protective clothing that are stylish, comfortable and cool.   KoolSpan and other [employer] make UV arm sleeves suitable for golfing, gardening and other activities.    Sunscreen instructions:  1. Use sunscreens with Zinc Oxide, Titanium Dioxide or Avobenzone to protect from UVA rays.  2. Use SPF 30-50+ to protect from UVB rays.  3. Re-apply every 2 hours even if water resistant.  4. Apply on your face every day even when cloudy and even in the winter. UVA \"aging rays\" penetrate window glass and are just as strong in the winter as in the summer.    FYI  You should use about 3 tablespoons of sunscreen to protect your whole body. Thus a typical eight ounce bottle of " sunscreen should last 4 applications. Remember, that the SPF rating is compromised if you don t apply enough. Most people only apply 1/2 - 1/3 of the amount they need. Also don t forget areas such as your ears, feet, upper back and harder to reach places. Keep in mind that these amounts should be increased for larger body sizes.    Sunscreens with titanium dioxide and/or zinc oxide in the active ingredients are physical blockers as opposed to chemical blockers. Chemical-free sunscreens should not irritate the skin.    Spray-on sunscreens may be used for touch-up application only, not as a base layer. Also, use with caution around small children due to inhalation risk.    SPF means sun protection factor, which is just the degree to which the sunscreen can protect against UVB rays. There is no rating system for UVA rays. SPF is calculated as the time skin will burn when sunscreen is applied vs. skin without sunscreen.    Water resistant sunscreens should be re-applied every 1-2 hours.    Product Recommendations:    Consider use of sunscreen sticks with Zinc Oxide and Titanium Dioxide active ingredients such as Neutrogena Pure&Free Baby Sunscreen Stick.    Good examples include: Blue Lizard, EltaMD, Solbar    Good daily moisturizers with SPF: Vanicream, CeraVe.    For sensitive skin, consider : SkinMedica Essential Defense Mineral Shield Broad Spectrum SPF 35    Men: consider use of Neutrogena Triple Protect Facial Lotion    Avoid retinyl palmitate products.  Avoid combination products that include both sunscreen and insect repellant, as sunscreen should be applied every 2 hours, but insect repellant should not be applied as frequently.    For more information:  https://www.skincancer.org/prevention/sun-protection/sunscreen/sunscreens-safe-and-effective    SKIN CANCER SELF-EXAM INSTRUCTIONS  Check every month in the mirror or with a household member. Be aware of any changes, especially bleeding or tenderness. Also,  make sure to check your nails for color changes after removal of nail polish.    For melanoma, check for:  A - Asymmetry. One half unlike the other half.  B - Border. Irregular, scalloped, ragged, notched, blurred or poorly defined borders.  C - Color. Color variations from one area to another, with shades of tan, brown and/or black present. Sometimes white, red or blue.  D - Diameter. Greater than 6 mm (about the size of a pencil eraser). Any new growth of a mole should be concerning and be evaluated.  E - Evolving. A mole or skin lesion that looks different from the rest or is changing in size, shape or color.    For basal cell carcinoma and squamous cell carcinoma, check for:    Sores, shiny bumps, nodules, scaly lesions, or wart-like growths that are itchy, tender, crusting, scabbing, eroding, oozing or bleeding.    Open sores/wounds or reddish/irritated areas that do not heal within 2-3 weeks.    Scar-like areas that are white, yellow or waxy in color.    Use OTC hydrocortisone 1% cream as needed for the area of scaling and itchiness on the face.    The patient was counseled about sunscreens and sun avoidance. The patient was counseled to check the skin regularly and report any lesion that is new, changing, itching, scabbing, bleeding or otherwise bothersome. The patient was discharged ambulatory and in stable condition.    TT: 25 minutes.  CT: 15 minutes.    The information in this document, created by the medical scribe for me, accurately reflects the services I personally performed and the decisions made by me. I have reviewed and approved this document for accuracy prior to leaving the patient care area.  May 7, 2019 10:57 AM  Katrina Husain MD  [facility]    Again, thank you for allowing me to participate in the care of your patient.        Sincerely,        Katrina Husain MD

## 2019-05-07 NOTE — PROCEDURES
Name : Shave Excision  Indication : Excision of tissue for pathology evaluation.  Location(s) : Right shoulder, 5 cm lateral to the AC joint: 4 mm in size irregularly pigmented brown lesion. ? Atypical nevus ? Other .  Completed by : Prachi Husain MD  Photo Taken : no.  Anesthesia : Patient was anesthetized by infiltrating the area surrounding the lesion with 1% lidocaine.   epinephrine [id number] : Yes.  Note : Discussed the risk of pain, infection, scarring, hypo- or hyperpigmentation and recurrence or need for re-treatment. The benefits of treatment and alternative treatments were also discussed.    During this procedure, the universal protocol was utilized. The patient's identity was confirmed by no less than two patient identifiers, correct procedure was verified, correct site was verified and marked as applicable and a final pause was completed.    Sterile technique was used throughout the procedure. The skin was cleaned and prepped with surgical cleanser. Once adequate anesthesia was obtained, the lesion was removed with a deep scallop shave procedure. The specimen was sent to pathology.    Direct pressure and aluminum chloride and monopolar cautery was applied for hemostasis. No bleeding was present upon the completion of the procedure. The wound was coated with antibacterial ointment. A dry sterile dressing was applied. Patient tolerated the procedure well and left in satisfactory condition.    Primary provider and referring provider will be informed regarding the tissue report when it returns.    Name : Shave Excision  Indication : Excision of tissue for pathology evaluation.  Location(s) : Left lateral abdomen: 6 mm x 3 mm in size irregularly pigmented brown macule. ? Atypical nevus ? Other.  Completed by : Prachi Husain MD  Photo Taken : no.  Anesthesia : Patient was anesthetized by infiltrating the area surrounding the lesion with 1% lidocaine.   epinephrine [id number] : Yes.  Note : Discussed the risk of  pain, infection, scarring, hypo- or hyperpigmentation and recurrence or need for re-treatment. The benefits of treatment and alternative treatments were also discussed.    During this procedure, the universal protocol was utilized. The patient's identity was confirmed by no less than two patient identifiers, correct procedure was verified, correct site was verified and marked as applicable and a final pause was completed.    Sterile technique was used throughout the procedure. The skin was cleaned and prepped with surgical cleanser. Once adequate anesthesia was obtained, the lesion was removed with a deep scallop shave procedure. The specimen was sent to pathology.    Direct pressure and aluminum chloride and monopolar cautery was applied for hemostasis. No bleeding was present upon the completion of the procedure. The wound was coated with antibacterial ointment. A dry sterile dressing was applied. Patient tolerated the procedure well and left in satisfactory condition.    Primary provider and referring provider will be informed regarding the tissue report when it returns.    Name : Shave Excision  Indication : Excision of tissue for pathology evaluation.  Location(s) : Back, 5 cm right of L1: 3 mm in size irregular darkly pigmented lesion. ? Atypical nevus ? Other.  Completed by : Prachi Husain MD  Photo Taken : no.  Anesthesia : Patient was anesthetized by infiltrating the area surrounding the lesion with 1% lidocaine.   epinephrine [id number] : Yes.  Note : Discussed the risk of pain, infection, scarring, hypo- or hyperpigmentation and recurrence or need for re-treatment. The benefits of treatment and alternative treatments were also discussed.    During this procedure, the universal protocol was utilized. The patient's identity was confirmed by no less than two patient identifiers, correct procedure was verified, correct site was verified and marked as applicable and a final pause was completed.    Sterile  technique was used throughout the procedure. The skin was cleaned and prepped with surgical cleanser. Once adequate anesthesia was obtained, the lesion was removed with a deep scallop shave procedure. The specimen was sent to pathology.    Direct pressure and aluminum chloride and monopolar cautery was applied for hemostasis. No bleeding was present upon the completion of the procedure. The wound was coated with antibacterial ointment. A dry sterile dressing was applied. Patient tolerated the procedure well and left in satisfactory condition.    Primary provider and referring provider will be informed regarding the tissue report when it returns.    Name : Shave Excision  Indication : Excision of tissue for pathology evaluation.  Location(s) : Back, at T10, 15 mm left of midline: 9 mm x 5 mm in size flesh-colored slightly raised lesion with surrounding irregular pigmentation. ? Atypical nevus ? Other.  Completed by : Prachi Husain MD  Photo Taken : no.  Anesthesia : Patient was anesthetized by infiltrating the area surrounding the lesion with 1% lidocaine.   epinephrine [id number] : Yes.  Note : Discussed the risk of pain, infection, scarring, hypo- or hyperpigmentation and recurrence or need for re-treatment. The benefits of treatment and alternative treatments were also discussed.    During this procedure, the universal protocol was utilized. The patient's identity was confirmed by no less than two patient identifiers, correct procedure was verified, correct site was verified and marked as applicable and a final pause was completed.    Sterile technique was used throughout the procedure. The skin was cleaned and prepped with surgical cleanser. Once adequate anesthesia was obtained, the lesion was removed with a deep scallop shave procedure. The specimen was sent to pathology.    Direct pressure and aluminum chloride and monopolar cautery was applied for hemostasis. No bleeding was present upon the completion of the  procedure. The wound was coated with antibacterial ointment. A dry sterile dressing was applied. Patient tolerated the procedure well and left in satisfactory condition.    Primary provider and referring provider will be informed regarding the tissue report when it returns.

## 2019-05-07 NOTE — PATIENT INSTRUCTIONS
"FUTURE APPOINTMENTS  Follow up in 1 year(s) for a full-body skin cancer screening.  Follow up per pathology report.    WOUND CARE INSTRUCTIONS  1. Wash hands before every dressing change.  2. After 24 hours, change dressing daily.  3. Wash the wound area with a mild soap, then rinse.  4. Gently pat dry with a sterile gauze or Q-tip.  5. Using a Q-tip, apply Vaseline or Aquaphor only over entire wound. Do NOT use Neosporin - as many people react to neomycin.  6. Finally, cover with a bandage or sterile non-stick gauze with micropore paper tape.  7. Repeat once daily until wound has healed.      Soap, water and shampoo will not hurt this area.    Do not go swimming or take baths, but showering is encouraged.    Limit use of the area where the procedure was done for a few days to allow for optimal healing.    If you experience bleeding:  Wash hands and hold firm pressure on the area for 10 minutes without checking to see if the bleeding has stopped. \"Checking\" pulls off the protective wound clot and restarts the bleeding all over again. Re-apply pressure for 10 minutes if necessary to stop bleeding.  Use additional sterile gauze and tape to maintain pressure once bleeding has stopped.  If bleeding continues, then call back to clinic at [phone].    Signs of Infection:  Infection can occur in any area where skin has been disrupted.  If you notice persistent redness, swelling, colored drainage, increasing pain, fever or other signs of infection, please call us at: [phone] and ask to have me or my colleague paged. We will call you back to discuss.    Pathology Results:  You will be notified, generally via letter or MyChart, in approximately 10 days. If there is anything we need to discuss or further treatment needed, I will call you to discuss it.    PATIENT INFORMATION : WOUNDS  During the healing process you will notice a number of changes. All wounds develop a small halo of redness surrounding the wound.  " This means healing is occurring. Severe itching with extensive redness usually indicates sensitivity to the ointment or bandage tape used to dress the wound.  You should call our office if this develops.      Swelling  and/or discoloration around your surgical site is common, particularly when performed around the eye.    All wounds normally drain.  The larger the wound the more drainage there will be.  After 7-10 days, you will notice the wound beginning to shrink and new skin will begin to grow.  The wound is healed when you can see skin has formed over the entire area.  A healed wound has a healthy, shiny look to the surface and is red to dark pink in color to normalize.  Wounds may take approximately 4-6 weeks to heal.  Larger wounds may take 6-8 weeks. After the wound is healed you may discontinue dressing changes.    You may experience a sensation of tightness as your wound heals. This is normal and will gradually subside.    Your healed wound may be sensitive to temperature changes. This sensitivity improves with time, but if you re having a lot of discomfort, try to avoid temperature extremes.    Patients frequently experience itching after their wound appears to have healed because of the continue healing under the skin.  Plain Vaseline will help relieve the itching.    ACTINIC KERATOSES POST-TREATMENT CARE INSTRUCTIONS  Actinic keratoses are benign, scaly or gritty lesions that appear in sun-exposed areas and may progress to skin cancers. For this reason, it is important to treat them before they become cancerous. Liquid nitrogen is the most commonly used and most effective treatment for actinic keratoses; it is mildly uncomfortable when applied to the skin, but the discomfort rapidly subsides.    Post-Treatment:  You may experience burning and/or stinging immediately following the procedure. The discomfort from the procedure may persist over the next 12-24 hours. The area treated will look pinker and  slightly swollen before the healing process begins. You may also notice redness, swelling, tenderness, weeping and crusts or scabs. Healing time is approximately 10-14 days.    Blister - You may or may notice blistering from the freezing. If you develop an uncomfortable blister from today's treatment, you may gently puncture this with a needle that has been cleaned with alcohol. However, do not remove the protective skin layer of the blister.    Scab - After a few days, you may notice scaliness or scab formation. Do not pick at the scabs because this may cause slower healing and a permanent scar.    The skin may appear temporarily darker at the treatment site, but this usually fades over a period of months, provided that the area is protected from the sun.    Care of the areas treated:    Wash the area with a mild cleanser.    Gently pat dry.    Do not rub.     Keep protected from the sun during the healing process and for a full year following treatment as the skin continues to remodel during this time.    Apply Vaseline or Aquaphor ointment sparingly to the site for the first 7 days after treatment.    Do not use Neosporin, as many people eventually develop a medication allergy, that can easily be confused with an infection, to Neomycin.    Return if:  There should not be any residual scaling. If there is any concern that the lesion has persisted after 4-6 weeks, make an appointment for a re-check. Healing time does vary depending on your individual healing process and the area of the body treated. Most patients will be healed in one month.    Signs of Infection:  Thankfully this is rare. However if you notice persistent colored drainage, increasing pain, fever or other signs of infection, please call us at: [phone]    SUN PROTECTION INSTRUCTIONS  Sun damage can lead to skin cancer and premature aging of the skin.      The best way to protect from sun damage to your skin is to avoid the sun during peak  "hours (10 am - 2 pm) even on overcast days.    Never use tanning beds. Tanning beds are associated with much higher risks of skin cancer.    All tanning damages the skin. Aim for ivory skin year round and you will have less trouble with your skin in years to come. There is no merit in getting \"a base tan\" before a warm weather vacation, as any tanning indicates your body's response to sun damage.    Stop smoking. Smokers have higher rates of skin cancer and also have premature skin wrinkling.    Use UPF sun-protective clothing, which while more expensive initially provides longer lasting coverage without having to worry about remembering to re-apply.  1. Wear a wide-brimmed hat and sunglasses.   2. Wear sun-protective clothing.  [employer] and other [employer] make sun protective clothing that are stylish, comfortable and cool.   [employer] other [employer] make UV arm sleeves suitable for golfing, gardening and other activities.    Sunscreen instructions:  1. Use sunscreens with Zinc Oxide, Titanium Dioxide or Avobenzone to protect from UVA rays.  2. Use SPF 30-50+ to protect from UVB rays.  3. Re-apply every 2 hours even if water resistant.  4. Apply on your face every day even when cloudy and even in the winter. UVA \"aging rays\" penetrate window glass and are just as strong in the winter as in the summer.    FYI  You should use about 3 tablespoons of sunscreen to protect your whole body. Thus a typical eight ounce bottle of sunscreen should last 4 applications. Remember, that the SPF rating is compromised if you don t apply enough. Most people only apply 1/2 - 1/3 of the amount they need. Also don t forget areas such as your ears, feet, upper back and harder to reach places. Keep in mind that these amounts should be increased for larger body sizes.    Sunscreens with titanium dioxide and/or zinc oxide in the active ingredients are physical blockers as opposed to chemical blockers. Chemical-free " sunscreens should not irritate the skin.    Spray-on sunscreens may be used for touch-up application only, not as a base layer. Also, use with caution around small children due to inhalation risk.    SPF means sun protection factor, which is just the degree to which the sunscreen can protect against UVB rays. There is no rating system for UVA rays. SPF is calculated as the time skin will burn when sunscreen is applied vs. skin without sunscreen.    Water resistant sunscreens should be re-applied every 1-2 hours.    Product Recommendations:    Consider use of sunscreen sticks with Zinc Oxide and Titanium Dioxide active ingredients such as Neutrogena Pure&Free Baby Sunscreen Stick.    Good examples include: Blue Lizard, EltaMD, Solbar    Good daily moisturizers with SPF: Vanicream, CeraVe.    For sensitive skin, consider : Skin[employer]ica Essential Defense Mineral Shield Broad Spectrum SPF 35    Men: consider use of Neutrogena Triple Protect Facial Lotion    Avoid retinyl palmitate products.  Avoid combination products that include both sunscreen and insect repellant, as sunscreen should be applied every 2 hours, but insect repellant should not be applied as frequently.    For more information:  https://www.skincancer.org/prevention/sun-protection/sunscreen/sunscreens-safe-and-effective    SKIN CANCER SELF-EXAM INSTRUCTIONS  Check every month in the mirror or with a household member. Be aware of any changes, especially bleeding or tenderness. Also, make sure to check your nails for color changes after removal of nail polish.    For melanoma, check for:  A - Asymmetry. One half unlike the other half.  B - Border. Irregular, scalloped, ragged, notched, blurred or poorly defined borders.  C - Color. Color variations from one area to another, with shades of tan, brown and/or black present. Sometimes white, red or blue.  D - Diameter. Greater than 6 mm (about the size of a pencil eraser). Any new growth of a mole should be  concerning and be evaluated.  E - Evolving. A mole or skin lesion that looks different from the rest or is changing in size, shape or color.    For basal cell carcinoma and squamous cell carcinoma, check for:    Sores, shiny bumps, nodules, scaly lesions, or wart-like growths that are itchy, tender, crusting, scabbing, eroding, oozing or bleeding.    Open sores/wounds or reddish/irritated areas that do not heal within 2-3 weeks.    Scar-like areas that are white, yellow or waxy in color.    Use OTC hydrocortisone 1% cream as needed for the area of scaling and itchiness on the face.

## 2019-05-07 NOTE — PROGRESS NOTES
[facility] - PRIMARY CARE SKIN    CC: skin cancer screening (full-body)  SUBJECTIVE:   Jose Henderson is a(n) 64 year old male who presents to clinic today for a full-body skin exam.    Bothersome lesions noticed by the patient or other skin concerns :  Issue One: His primary care provider has noticed an abnormally colored and sized lesion on the back.    Issue Two: A brown spot on the left face becomes scaly and itchy.    Personal Medical History  Skin cancer: YES - squamous cell carcinoma, other skin cancers on face    Family Medical History  Skin cancer: NO    Sun Exposure History  Previous history of significant sun exposure:  Blistering sunburns: YES - 1-2 times.  Tanning beds: YES - when younger.  Sunscreen usage: YES.    Occupation: retired,  (both indoor & outdoor).    Refer to electronic medical record (EMR) for past medical history and medications.    INTEGUMENTARY/SKIN: POSITIVE for changing lesion  ROS: 14 point review of systems was negative except the symptoms listed above in the HPI.    This document serves as a record of the services and decisions personally performed and made by Katrina Husain MD and was created by Ryland Connelly, a trained medical scribe, based on personal observations and provider statements to the medical scribe.  May 7, 2019 10:57 AM   Ryland Connelly    OBJECTIVE:   GENERAL: healthy, alert and no distress.  LYMPH: no cervical, supraclavicular, axillary, or inguinal adenopathy  SKIN: Betts Skin Type - II.  This patient was examined from the top of the head to the bottom of the feet  including scalp, face, neck, trunk, buttocks, both arms, both legs, both hands, both feet, and all fingers and toes. The dermatoscope was used to help evaluate pigmented lesions.  Skin Pertinent Findings:  Scalp, vertex: Two 4 mm in size smooth flesh-colored flat-topped benign-appearing lesion.    Left ala nasi and left mid-cheek: brown, macule(s) most consistent with benign solar  lentigo.    Upper extremities, Trunk: Multiple brown macules of various sizes and shapes most consistent with (benign) melanocytic nevi.    Abdomen: slightly raised, red lesion(s) consistent with capillary hemangioma. stuck-on appearing papules, raised, brown, coarse-textured, round lesion(s) most consistent with seborrheic keratoses.    Significant Findings:  Right shoulder, 5 cm lateral to the AC joint: 4 mm in size irregularly pigmented brown lesion. ? Atypical nevus ? Other     Left lateral abdomen: 6 mm x 3 mm in size irregularly pigmented brown macule. ? Atypical nevus ? Other    Back, 5 cm right of L1: 3 mm in size irregular darkly pigmented lesion. ? Atypical nevus ? Other     Back, at T10, 15 mm left of midline: 9 mm x 5 mm in size flesh-colored slightly raised lesion with surrounding irregular pigmentation. ? Atypical nevus ? Other    Left mid-cheek: 8  mm in size, erythematous, scaly, non-indurated lesion(s) most consistent with actinic keratoses.  Name: Liquid Nitrogen Cry-Ac Cryotherapy.  Indication: Pre-malignant lesion.  Location(s): left mid-cheek - x1.  Completed by: Prachi Husain MD.  Note : Discussed natural history of lesion and treatment options. Prior to treatment, we discussed inflammation, tenderness post-procedure, the healing process, and the risks of pain, infection, scarring, blistering, and hypo-/hyperpigmentation after healing. Explained that these lesions may grow back and may need additional treatment or re-treatment. The patient expressed a desire to proceed with cryotherapy.    The lesion(s) was treated with liquid nitrogen Cry-Ac, five second freeze repeated twice with a pause to allow for the area to thaw.    Patient tolerated the procedure well and left in good condition. If this lesion should persist or recur, then it needs to be re-evaluated.  Total number of lesions treated: 1.    ASSESSMENT:     Encounter Diagnoses   Name Primary?     Skin cancer screening Yes     History of  "skin cancer      Solar lentigo      AK (actinic keratosis)      Neoplasm of uncertain behavior of skin      Seborrheic keratoses      Multiple benign melanocytic nevi          PLAN:   Patient Instructions   FUTURE APPOINTMENTS  Follow up in 1 year(s) for a full-body skin cancer screening.  Follow up per pathology report.    WOUND CARE INSTRUCTIONS  1. Wash hands before every dressing change.  2. After 24 hours, change dressing daily.  3. Wash the wound area with a mild soap, then rinse.  4. Gently pat dry with a sterile gauze or Q-tip.  5. Using a Q-tip, apply Vaseline or Aquaphor only over entire wound. Do NOT use Neosporin - as many people react to neomycin.  6. Finally, cover with a bandage or sterile non-stick gauze with micropore paper tape.  7. Repeat once daily until wound has healed.      Soap, water and shampoo will not hurt this area.    Do not go swimming or take baths, but showering is encouraged.    Limit use of the area where the procedure was done for a few days to allow for optimal healing.    If you experience bleeding:  Wash hands and hold firm pressure on the area for 10 minutes without checking to see if the bleeding has stopped. \"Checking\" pulls off the protective wound clot and restarts the bleeding all over again. Re-apply pressure for 10 minutes if necessary to stop bleeding.  Use additional sterile gauze and tape to maintain pressure once bleeding has stopped.  If bleeding continues, then call back to clinic at [phone].    Signs of Infection:  Infection can occur in any area where skin has been disrupted.  If you notice persistent redness, swelling, colored drainage, increasing pain, fever or other signs of infection, please call us at: [phone] and ask to have me or my colleague paged. We will call you back to discuss.    Pathology Results:  You will be notified, generally via letter or MyChart, in approximately 10 days. If there is anything we need to discuss or further " treatment needed, I will call you to discuss it.    PATIENT INFORMATION : WOUNDS  During the healing process you will notice a number of changes. All wounds develop a small halo of redness surrounding the wound.  This means healing is occurring. Severe itching with extensive redness usually indicates sensitivity to the ointment or bandage tape used to dress the wound.  You should call our office if this develops.      Swelling  and/or discoloration around your surgical site is common, particularly when performed around the eye.    All wounds normally drain.  The larger the wound the more drainage there will be.  After 7-10 days, you will notice the wound beginning to shrink and new skin will begin to grow.  The wound is healed when you can see skin has formed over the entire area.  A healed wound has a healthy, shiny look to the surface and is red to dark pink in color to normalize.  Wounds may take approximately 4-6 weeks to heal.  Larger wounds may take 6-8 weeks. After the wound is healed you may discontinue dressing changes.    You may experience a sensation of tightness as your wound heals. This is normal and will gradually subside.    Your healed wound may be sensitive to temperature changes. This sensitivity improves with time, but if you re having a lot of discomfort, try to avoid temperature extremes.    Patients frequently experience itching after their wound appears to have healed because of the continue healing under the skin.  Plain Vaseline will help relieve the itching.    ACTINIC KERATOSES POST-TREATMENT CARE INSTRUCTIONS  Actinic keratoses are benign, scaly or gritty lesions that appear in sun-exposed areas and may progress to skin cancers. For this reason, it is important to treat them before they become cancerous. Liquid nitrogen is the most commonly used and most effective treatment for actinic keratoses; it is mildly uncomfortable when applied to the skin, but the discomfort rapidly  subsides.    Post-Treatment:  You may experience burning and/or stinging immediately following the procedure. The discomfort from the procedure may persist over the next 12-24 hours. The area treated will look pinker and slightly swollen before the healing process begins. You may also notice redness, swelling, tenderness, weeping and crusts or scabs. Healing time is approximately 10-14 days.    Blister - You may or may notice blistering from the freezing. If you develop an uncomfortable blister from today's treatment, you may gently puncture this with a needle that has been cleaned with alcohol. However, do not remove the protective skin layer of the blister.    Scab - After a few days, you may notice scaliness or scab formation. Do not pick at the scabs because this may cause slower healing and a permanent scar.    The skin may appear temporarily darker at the treatment site, but this usually fades over a period of months, provided that the area is protected from the sun.    Care of the areas treated:    Wash the area with a mild cleanser.    Gently pat dry.    Do not rub.     Keep protected from the sun during the healing process and for a full year following treatment as the skin continues to remodel during this time.    Apply Vaseline or Aquaphor ointment sparingly to the site for the first 7 days after treatment.    Do not use Neosporin, as many people eventually develop a medication allergy, that can easily be confused with an infection, to Neomycin.    Return if:  There should not be any residual scaling. If there is any concern that the lesion has persisted after 4-6 weeks, make an appointment for a re-check. Healing time does vary depending on your individual healing process and the area of the body treated. Most patients will be healed in one month.    Signs of Infection:  Thankfully this is rare. However if you notice persistent colored drainage, increasing pain, fever or other signs of infection, please  "call us at: [phone]    SUN PROTECTION INSTRUCTIONS  Sun damage can lead to skin cancer and premature aging of the skin.      The best way to protect from sun damage to your skin is to avoid the sun during peak hours (10 am - 2 pm) even on overcast days.    Never use tanning beds. Tanning beds are associated with much higher risks of skin cancer.    All tanning damages the skin. Aim for ivory skin year round and you will have less trouble with your skin in years to come. There is no merit in getting \"a base tan\" before a warm weather vacation, as any tanning indicates your body's response to sun damage.    Stop smoking. Smokers have higher rates of skin cancer and also have premature skin wrinkling.    Use UPF sun-protective clothing, which while more expensive initially provides longer lasting coverage without having to worry about remembering to re-apply.  1. Wear a wide-brimmed hat and sunglasses.   2. Wear sun-protective clothing.  [employer] and other [employer] make sun protective clothing that are stylish, comfortable and cool.   [employer] and other [employer] make UV arm sleeves suitable for golfing, gardening and other activities.    Sunscreen instructions:  1. Use sunscreens with Zinc Oxide, Titanium Dioxide or Avobenzone to protect from UVA rays.  2. Use SPF 30-50+ to protect from UVB rays.  3. Re-apply every 2 hours even if water resistant.  4. Apply on your face every day even when cloudy and even in the winter. UVA \"aging rays\" penetrate window glass and are just as strong in the winter as in the summer.    FYI  You should use about 3 tablespoons of sunscreen to protect your whole body. Thus a typical eight ounce bottle of sunscreen should last 4 applications. Remember, that the SPF rating is compromised if you don t apply enough. Most people only apply 1/2 - 1/3 of the amount they need. Also don t forget areas such as your ears, feet, upper back and harder to reach places. Keep in " mind that these amounts should be increased for larger body sizes.    Sunscreens with titanium dioxide and/or zinc oxide in the active ingredients are physical blockers as opposed to chemical blockers. Chemical-free sunscreens should not irritate the skin.    Spray-on sunscreens may be used for touch-up application only, not as a base layer. Also, use with caution around small children due to inhalation risk.    SPF means sun protection factor, which is just the degree to which the sunscreen can protect against UVB rays. There is no rating system for UVA rays. SPF is calculated as the time skin will burn when sunscreen is applied vs. skin without sunscreen.    Water resistant sunscreens should be re-applied every 1-2 hours.    Product Recommendations:    Consider use of sunscreen sticks with Zinc Oxide and Titanium Dioxide active ingredients such as Neutrogena Pure&Free Baby Sunscreen Stick.    Good examples include: Blue Lizard, EltaMD, Solbar    Good daily moisturizers with SPF: Vanicream, CeraVe.    For sensitive skin, consider : SkinMedica Essential Defense Mineral Shield Broad Spectrum SPF 35    Men: consider use of Neutrogena Triple Protect Facial Lotion    Avoid retinyl palmitate products.  Avoid combination products that include both sunscreen and insect repellant, as sunscreen should be applied every 2 hours, but insect repellant should not be applied as frequently.    For more information:  https://www.skincancer.org/prevention/sun-protection/sunscreen/sunscreens-safe-and-effective    SKIN CANCER SELF-EXAM INSTRUCTIONS  Check every month in the mirror or with a household member. Be aware of any changes, especially bleeding or tenderness. Also, make sure to check your nails for color changes after removal of nail polish.    For melanoma, check for:  A - Asymmetry. One half unlike the other half.  B - Border. Irregular, scalloped, ragged, notched, blurred or poorly defined borders.  C - Color. Color  variations from one area to another, with shades of tan, brown and/or black present. Sometimes white, red or blue.  D - Diameter. Greater than 6 mm (about the size of a pencil eraser). Any new growth of a mole should be concerning and be evaluated.  E - Evolving. A mole or skin lesion that looks different from the rest or is changing in size, shape or color.    For basal cell carcinoma and squamous cell carcinoma, check for:    Sores, shiny bumps, nodules, scaly lesions, or wart-like growths that are itchy, tender, crusting, scabbing, eroding, oozing or bleeding.    Open sores/wounds or reddish/irritated areas that do not heal within 2-3 weeks.    Scar-like areas that are white, yellow or waxy in color.    Use OTC hydrocortisone 1% cream as needed for the area of scaling and itchiness on the face.    The patient was counseled about sunscreens and sun avoidance. The patient was counseled to check the skin regularly and report any lesion that is new, changing, itching, scabbing, bleeding or otherwise bothersome. The patient was discharged ambulatory and in stable condition.    TT: 25 minutes.  CT: 15 minutes.    The information in this document, created by the medical scribe for me, accurately reflects the services I personally performed and the decisions made by me. I have reviewed and approved this document for accuracy prior to leaving the patient care area.  May 7, 2019 10:57 AM  Katrina Husain MD  [facility] [place] [facility]

## 2019-05-13 LAB — COPATH REPORT: NORMAL

## 2019-05-14 ENCOUNTER — TELEPHONE (OUTPATIENT)
Dept: FAMILY MEDICINE | Facility: CLINIC | Age: 64
End: 2019-05-14

## 2019-05-14 NOTE — TELEPHONE ENCOUNTER
Patient notified of test results and providers message, patient has no further questions. Patient does need to coordinate scheduling the wide with his spouse and will call back when he knows the best ay to schedule    Luz JOSHIRN BSN  [facility]  [phone]

## 2019-05-14 NOTE — TELEPHONE ENCOUNTER
Left message on answering machine for patient to call back.    Luz Haines,RN BSN  [facility]  [phone]

## 2019-05-14 NOTE — TELEPHONE ENCOUNTER
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      Mild and moderate atypical nevus on the left lateral abdomen and back     Normal nevus or mole on the left back.     FYI information regarding atypical nevus : An atypical nevus is benign but may have potential to evolve into a melanoma skin cancer over time. There are degrees of atypia called mild, moderate and severe . A mild atypical nevus does not need a re-excision but if pigmentation should recur then it should be evaluated. The more severe atypical changes that are present then  more concern there is regarding its potential to evolve into a melanoma. Usually I may recommend re excision for a moderate- severe atypical nevus.     Thank you,   Katrina Husain M.D

## 2019-05-17 ENCOUNTER — TELEPHONE (OUTPATIENT)
Dept: FAMILY MEDICINE | Facility: CLINIC | Age: 64
End: 2019-05-17

## 2019-05-17 NOTE — TELEPHONE ENCOUNTER
Reason for call:  Patient reporting a symptom    Symptom or request: Patient reporting pruritic red ring around the scabs where he had an excision. He would like to know of any creams to alleviate this.     Duration (how long have symptoms been present): couple days    Have you been treated for this before? No    Additional comments: none    Phone Number patient can be reached at:  Home number on file [phone] (home)    Best Time:  anytime    Can we leave a detailed message on this number:  YES    Call taken on 5/17/2019 at 3:05 PM by Stacie ARREAGA

## 2019-05-20 NOTE — TELEPHONE ENCOUNTER
Have him  1 % hydrocortisone cream, apply two times per day around the edges. May use for 7-10 days.    Thank you,    Katrina Husain M.D.

## 2019-05-20 NOTE — TELEPHONE ENCOUNTER
Left non-detailed message to call the clinic back at [phone] and ask to speak with a  triage nurse.   Francia Jimenez RN

## 2019-05-21 NOTE — TELEPHONE ENCOUNTER
Left detailed message with providers response- advised to apply around the wound not directly in center.    Luz HainesRN BSN  [facility]  [phone]

## 2019-06-20 ENCOUNTER — OFFICE VISIT (OUTPATIENT)
Dept: FAMILY MEDICINE | Facility: CLINIC | Age: 64
End: 2019-06-20
Payer: COMMERCIAL

## 2019-06-20 VITALS — DIASTOLIC BLOOD PRESSURE: 78 MMHG | SYSTOLIC BLOOD PRESSURE: 122 MMHG

## 2019-06-20 DIAGNOSIS — Z85.828 HISTORY OF SKIN CANCER: ICD-10-CM

## 2019-06-20 DIAGNOSIS — D23.5 DYSPLASTIC NEVUS OF TRUNK: Primary | ICD-10-CM

## 2019-06-20 DIAGNOSIS — Z86.018 HISTORY OF DYSPLASTIC NEVUS: ICD-10-CM

## 2019-06-20 PROCEDURE — 12032 INTMD RPR S/A/T/EXT 2.6-7.5: CPT | Performed by: FAMILY MEDICINE

## 2019-06-20 PROCEDURE — 11402 EXC TR-EXT B9+MARG 1.1-2 CM: CPT | Mod: 51 | Performed by: FAMILY MEDICINE

## 2019-06-20 PROCEDURE — 88305 TISSUE EXAM BY PATHOLOGIST: CPT | Mod: TC | Performed by: FAMILY MEDICINE

## 2019-06-20 NOTE — PATIENT INSTRUCTIONS
"FUTURE APPOINTMENTS  Follow up in 7 days for bandage change with the nurse.  Follow up in 3 months for re-evaluation of excision site.  Follow up in May 2020 for a full-body skin cancer screening.    If any problems with wound healing:  call directly back to clinic RN at [phone]    BANDAGE CARE INSTRUCTIONS    Keep dressing completely dry.    Make sure to avoid soaking the procedure area. Cover with Tegaderm or plastic wrap when showering.    Limit use of the area where the procedure was done for a few days to allow for optimal healing.    If you experience bleeding:  Wash hands and hold firm pressure on the area for 10 minutes without checking to see if the bleeding has stopped. \"Checking\" pulls off the protective wound clot and restarts the bleeding all over again. Re-apply pressure for 10 minutes if necessary to stop bleeding.  Use additional sterile gauze and tape to maintain pressure once bleeding has stopped.  If bleeding continues, then call back to clinic at [phone].    Signs of Infection:  Infection can occur in any area where skin has been disrupted.  If you notice persistent redness, swelling, colored drainage, increasing pain, fever or other signs of infection, please call us at: [phone] and ask to have me or my colleague paged. We will call you back to discuss.    Pathology Results:  You will be notified, generally via letter or MyChart, in approximately 10 days. If there is anything we need to discuss or further treatment needed, I will call you to discuss it.    PAIN CONTROL INSTRUCTIONS    First, try either acetaminophen (Tylenol), or NSAID ibuprofen (Advil, Teresa, etc), or NSAID naproxen (Aleve, Naprosyn)    Acetaminophen dosage : one 325-500 mg tablet taken every 4-6 hours with a maximum of 4000 mg/day = 4 g/day    Ibuprofen dosage : one 600 mg tablet taken every 4-6 hours with a maximum of 4-6 tablets/day    Naproxen dosage : one 500 mg tablet taken twice daily with a maximum " of 2 tablets/day     Second, try combining acetaminophen and an NSAID - often the combination will work better then either one alone.

## 2019-06-20 NOTE — LETTER
6/20/2019         RE: Jose Henderson  [address] [facility] [address]        Dear Colleague,    Thank you for referring your patient, Jose Henderson, to the [facility] [place]. Please see a copy of my visit note below.    [facility] - PRIMARY CARE SKIN    CC: moderate-severe dysplastic nevus(i)  SUBJECTIVE:   Jose Henderson is a(n) 64 year old male who presents to clinic today for follow-up wide excision of a moderate-severe dysplastic nevus(i) on the right shoulder.    Tissue Pathology Report from 5/7/19          Personal Medical History  Skin Cancer: YES - squamous cell carcinoma, other skin cancers on face. Dysplastic nevus(i).    Family Medical History  Skin Cancer: NO    Sun Exposure History  Previous history of significant sun exposure:  Blistering sunburns: YES - 1-2 times.  Tanning beds: YES - when younger.  Sunscreen usage: YES.     Occupation: retired,  (both indoor & outdoor).    Refer to electronic medical record (EMR) for past medical history and medications.    ROS: 14 point review of systems was negative except the symptoms listed above in the HPI.    This document serves as a record of the services and decisions personally performed and made by Katrina Husain MD and was created by Ryland Connelly, a trained medical scribe, based on personal observations and provider statements to the medical scribe.  June 20, 2019 9:55 AM   Ryland Connelly    OBJECTIVE:   GENERAL: healthy, alert and no distress.  SKIN: Betts Skin Type - II.  Trunk examined. The dermatoscope was used to help evaluate pigmented lesions.  Skin Pertinent Findings:  Right shoulder, 5 cm lateral to AC joint: 7 mm in size healed shave site. Previous pathology indicated junctional melanocytic nevus with moderate to severe atypia.    ASSESSMENT:     Encounter Diagnoses   Name Primary?     Dysplastic nevus of trunk Yes     History of skin cancer      History of dysplastic nevus          PLAN:  "  Patient Instructions   FUTURE APPOINTMENTS  Follow up in 7 days for bandage change with the nurse.  Follow up in 3 months for re-evaluation of excision site.  Follow up in May 2020 for a full-body skin cancer screening.    If any problems with wound healing:  call directly back to clinic RN at [phone]    BANDAGE CARE INSTRUCTIONS    Keep dressing completely dry.    Make sure to avoid soaking the procedure area. Cover with Tegaderm or plastic wrap when showering.    Limit use of the area where the procedure was done for a few days to allow for optimal healing.    If you experience bleeding:  Wash hands and hold firm pressure on the area for 10 minutes without checking to see if the bleeding has stopped. \"Checking\" pulls off the protective wound clot and restarts the bleeding all over again. Re-apply pressure for 10 minutes if necessary to stop bleeding.  Use additional sterile gauze and tape to maintain pressure once bleeding has stopped.  If bleeding continues, then call back to clinic at [phone].    Signs of Infection:  Infection can occur in any area where skin has been disrupted.  If you notice persistent redness, swelling, colored drainage, increasing pain, fever or other signs of infection, please call us at: [phone] and ask to have me or my colleague paged. We will call you back to discuss.    Pathology Results:  You will be notified, generally via letter or MyChart, in approximately 10 days. If there is anything we need to discuss or further treatment needed, I will call you to discuss it.    PAIN CONTROL INSTRUCTIONS    First, try either acetaminophen (Tylenol), or NSAID ibuprofen (Advil, Teresa, etc), or NSAID naproxen (Aleve, Naprosyn)    Acetaminophen dosage : one 325-500 mg tablet taken every 4-6 hours with a maximum of 4000 mg/day = 4 g/day    Ibuprofen dosage : one 600 mg tablet taken every 4-6 hours with a maximum of 4-6 tablets/day    Naproxen dosage : one 500 mg tablet taken " twice daily with a maximum of 2 tablets/day     Second, try combining acetaminophen and an NSAID - often the combination will work better then either one alone.        Signs of infection (spreading erythema, increasing pain, purulent discharge) were discussed. Limit physical activity for the next several days involving this area. No swimming, keep lesions clean and dry except for showering until the sutures are removed or absorbed. The patient will be notified of the pathology results at the next office visit or via MyChart or phone. The patient was given written discharge instructions and was discharged in stable condition.    TT: 40 minutes.  CT: 10 minutes.    The information in this document, created by the medical scribe for me, accurately reflects the services I personally performed and the decisions made by me. I have reviewed and approved this document for accuracy prior to leaving the patient care area.  June 20, 2019 9:55 AM  Katrina Husain MD  [facility] [place]    Again, thank you for allowing me to participate in the care of your patient.        Sincerely,        Katrnia Husain MD

## 2019-06-20 NOTE — PROCEDURES
Name: Wide Excision  Indication: Wide excision of tissue for pathology evaluation of dysplasia.  Location(s): Right shoulder, 5 cm lateral to AC joint: 7 mm in size healed shave site. Previous pathology indicated junctional melanocytic nevus with moderate to severe atypia.  Completed by: Prachi Husain MD.  Photo Taken: NO.  Anesthesia: Patient was anesthetized by infiltrating the area surrounding the lesion with 1% lidocaine and epinephrine [id number].  Note: Prior to procedure we discussed expectations for healing, risk of infection, and scar formation. Discussed other treatment options available. Discussed the risk of pain, infection, scarring, hypo- or hyperpigmentation and recurrence or need for re-treatment. The benefits of treatment and alternative treatments were also discussed.    During this procedure, the universal protocol was utilized. The patient's identity was confirmed by no less than two patient identifiers, correct procedure was verified, correct site was verified and marked as applicable and a final pause was completed.    Sterile technique was used throughout the procedure. The skin was cleaned and prepped with Chloroprep. A 5 mm margin was marked out on each side of the lesion. Sterile drapes were laid out. Once adequate anesthesia was obtained, an elliptical incision was made with a #15 blade through the epidermis and dermis. The tissue specimen was placed in formalin and sent to pathology.    Direct pressure and monopolar cautery was applied for hemostasis.  Edges were undermined with a Metzenbaum.  Defect was approximated with 0 Vicryl.  Edges were approximated with 6-0 Rapidgut interrupted simple stitch.  Minimal bleeding occurred. Dressing was applied and patient left in satisfactory condition.    Widest diameter: 1.7 cm.  Final length of defect: 5 cm.    Bandage change: 7 days.    Primary provider and referring provider will be informed regarding tissue sample report (and/or wound culture)  when it returns.

## 2019-06-20 NOTE — PROGRESS NOTES
[facility] - PRIMARY CARE SKIN    CC: moderate-severe dysplastic nevus(i)  SUBJECTIVE:   Jose Henderson is a(n) 64 year old male who presents to clinic today for follow-up wide excision of a moderate-severe dysplastic nevus(i) on the right shoulder.    Tissue Pathology Report from 5/7/19          Personal Medical History  Skin Cancer: YES - squamous cell carcinoma, other skin cancers on face. Dysplastic nevus(i).    Family Medical History  Skin Cancer: NO    Sun Exposure History  Previous history of significant sun exposure:  Blistering sunburns: YES - 1-2 times.  Tanning beds: YES - when younger.  Sunscreen usage: YES.     Occupation: retired,  (both indoor & outdoor).    Refer to electronic medical record (EMR) for past medical history and medications.    ROS: 14 point review of systems was negative except the symptoms listed above in the HPI.    This document serves as a record of the services and decisions personally performed and made by Katrina Husain MD and was created by Ryland Connelly, a trained medical scribe, based on personal observations and provider statements to the medical scribe.  June 20, 2019 9:55 AM   Ryland Connelly    OBJECTIVE:   GENERAL: healthy, alert and no distress.  SKIN: Betts Skin Type - II.  Trunk examined. The dermatoscope was used to help evaluate pigmented lesions.  Skin Pertinent Findings:  Right shoulder, 5 cm lateral to AC joint: 7 mm in size healed shave site. Previous pathology indicated junctional melanocytic nevus with moderate to severe atypia.    ASSESSMENT:     Encounter Diagnoses   Name Primary?     Dysplastic nevus of trunk Yes     History of skin cancer      History of dysplastic nevus          PLAN:   Patient Instructions   FUTURE APPOINTMENTS  Follow up in 7 days for bandage change with the nurse.  Follow up in 3 months for re-evaluation of excision site.  Follow up in May 2020 for a full-body skin cancer screening.    If any problems with wound  "healing:  call directly back to clinic RN at [phone]    BANDAGE CARE INSTRUCTIONS    Keep dressing completely dry.    Make sure to avoid soaking the procedure area. Cover with Tegaderm or plastic wrap when showering.    Limit use of the area where the procedure was done for a few days to allow for optimal healing.    If you experience bleeding:  Wash hands and hold firm pressure on the area for 10 minutes without checking to see if the bleeding has stopped. \"Checking\" pulls off the protective wound clot and restarts the bleeding all over again. Re-apply pressure for 10 minutes if necessary to stop bleeding.  Use additional sterile gauze and tape to maintain pressure once bleeding has stopped.  If bleeding continues, then call back to clinic at [phone].    Signs of Infection:  Infection can occur in any area where skin has been disrupted.  If you notice persistent redness, swelling, colored drainage, increasing pain, fever or other signs of infection, please call us at: [phone] and ask to have me or my colleague paged. We will call you back to discuss.    Pathology Results:  You will be notified, generally via letter or MyChart, in approximately 10 days. If there is anything we need to discuss or further treatment needed, I will call you to discuss it.    PAIN CONTROL INSTRUCTIONS    First, try either acetaminophen (Tylenol), or NSAID ibuprofen (Advil, Teresa, etc), or NSAID naproxen (Aleve, Naprosyn)    Acetaminophen dosage : one 325-500 mg tablet taken every 4-6 hours with a maximum of 4000 mg/day = 4 g/day    Ibuprofen dosage : one 600 mg tablet taken every 4-6 hours with a maximum of 4-6 tablets/day    Naproxen dosage : one 500 mg tablet taken twice daily with a maximum of 2 tablets/day     Second, try combining acetaminophen and an NSAID - often the combination will work better then either one alone.        Signs of infection (spreading erythema, increasing pain, purulent discharge) were " discussed. Limit physical activity for the next several days involving this area. No swimming, keep lesions clean and dry except for showering until the sutures are removed or absorbed. The patient will be notified of the pathology results at the next office visit or via MyChart or phone. The patient was given written discharge instructions and was discharged in stable condition.    TT: 40 minutes.  CT: 10 minutes.    The information in this document, created by the medical scribe for me, accurately reflects the services I personally performed and the decisions made by me. I have reviewed and approved this document for accuracy prior to leaving the patient care area.  June 20, 2019 9:55 AM  Katrina Husain MD  [facility] [place]

## 2019-06-24 ENCOUNTER — TELEPHONE (OUTPATIENT)
Dept: FAMILY MEDICINE | Facility: CLINIC | Age: 64
End: 2019-06-24

## 2019-06-24 LAB — COPATH REPORT: NORMAL

## 2019-06-24 NOTE — TELEPHONE ENCOUNTER
Reason for Call:  Other returning call    Detailed comments: Pt called this afternoon returning a phone call for the nurse working with Dr. Husain. Please give pt a call back when you can. Thank you.    Phone Number Patient can be reached at: Home number on file [phone] (home)    Best Time:     Can we leave a detailed message on this number? YES    Call taken on 6/24/2019 at 2:38 PM by Andie Pritchard

## 2019-06-24 NOTE — TELEPHONE ENCOUNTER
Left non-detailed message to call the clinic back at [phone] and ask to speak with a  triage nurse. Francia Jimenez RN

## 2019-06-25 NOTE — TELEPHONE ENCOUNTER
Patient notified of test results and providers message, patient has no further questions. Has appointment scheduled for a bandage change on Thursday.    Luz JOSHI RN BSN  [facility]  [phone]

## 2019-06-25 NOTE — TELEPHONE ENCOUNTER
Notes recorded by Katrina Husain MD on 6/24/2019 at 1:42 PM CDT  Please call and let him know that there was no residual atypical cells.     Thank you,   Katrina Husain M.D.

## 2019-06-27 ENCOUNTER — ALLIED HEALTH/NURSE VISIT (OUTPATIENT)
Dept: NURSING | Facility: CLINIC | Age: 64
End: 2019-06-27
Payer: COMMERCIAL

## 2019-06-27 DIAGNOSIS — Z48.00 CHANGE OF DRESSING: Primary | ICD-10-CM

## 2019-06-27 PROCEDURE — 99207 ZZC NO CHARGE NURSE ONLY: CPT

## 2019-06-27 NOTE — PROGRESS NOTES
Patient returned to clinic for post surgery 1 week follow up bandage change. Patient has no complaints, denies pain.  Bandage removed from right shoulder. Area cleansed with wound cleanser. Site is healing with no signs of infection, wound edges approximating well.   Reapplied new steri strips and paper tape.     Advised to watch for signs and symptons of infection; spreading redness, increasing pain, drainage, odor, fever.   Call or report promptly to clinic if any of these symptoms are present.    Wound care post op instructions given and discussed for 14 day bandage removal and continued incision/scar care.     Patient verbalized understanding.      Margo Sandoval   [facility]  [phone]      .

## 2020-01-14 NOTE — PROGRESS NOTES
[facility] - PRIMARY CARE SKIN    CC: skin cancer screening (full-body)  SUBJECTIVE:   Jose Henderson is a(n) 64 year old male who presents to clinic today for a full-body skin exam history of moderate-severe atypical nevus, s/p wide excision.     Bothersome lesions noticed by the patient or other skin concerns :  Issue One: no particular concerns    Personal Medical History  Skin cancer: YES - squamous cell carcinoma, other skin cancers on face, moderate- severe atypical nevus on the back.Mild and moderate atypical nevi.    Family Medical History  Skin cancer: NO    Sun Exposure History  Previous history of significant sun exposure:  Blistering sunburns: YES - 1-2 times.  Tanning beds: YES - when younger.  Sunscreen usage: YES.    Occupation: retired,  (both indoor & outdoor).    Refer to electronic medical record (EMR) for past medical history and medications.    INTEGUMENTARY/SKIN: POSITIVE for changing lesion  ROS: 14 point review of systems was negative except the symptoms listed above in the HPI.        OBJECTIVE:   GENERAL: healthy, alert and no distress.  LYMPH: no cervical, supraclavicular, axillary, or inguinal adenopathy  SKIN: Betts Skin Type - II.  This patient was examined from the top of the head to the bottom of the feet  including scalp, face, neck, trunk, buttocks, both arms, both legs, both hands, both feet, and all fingers and toes. The dermatoscope was used to help evaluate pigmented lesions.  Skin Pertinent Findings:  Scalp, vertex: Two 4 mm in size smooth flesh-colored flat-topped benign-appearing lesion.    Upper extremities, Trunk: Multiple brown macules of various sizes and shapes most consistent with (benign) melanocytic nevi and [id number], coarse-textured, brown, raised lesion(s) most consistent with seborrheic keratosis    Left superior shoulder- well healed scar  Abdomen: slightly raised, red lesion(s) consistent with capillary hemangioma. stuck-on  appearing papules, raised, brown, coarse-textured, round lesion(s) most consistent with seborrheic keratoses.  Feet : some scaling      ASSESSMENT:     Encounter Diagnoses   Name Primary?     Solar lentiginosis Yes     Seborrheic keratosis      History of dysplastic nevus      History of skin cancer          PLAN:   Patient Instructions   Cerave Healing ointment - blue tube - use for dry feet    Skin exam in one year      The patient was counseled about sunscreens and sun avoidance. The patient was counseled to check the skin regularly and report any lesion that is new, changing, itching, scabbing, bleeding or otherwise bothersome. The patient was discharged ambulatory and in stable condition.    TT: 25 minutes.  CT: 15 minutes.

## 2020-01-15 ENCOUNTER — OFFICE VISIT (OUTPATIENT)
Dept: FAMILY MEDICINE | Facility: CLINIC | Age: 65
End: 2020-01-15
Payer: COMMERCIAL

## 2020-01-15 VITALS — SYSTOLIC BLOOD PRESSURE: 122 MMHG | DIASTOLIC BLOOD PRESSURE: 76 MMHG

## 2020-01-15 DIAGNOSIS — Z85.828 HISTORY OF SKIN CANCER: ICD-10-CM

## 2020-01-15 DIAGNOSIS — L82.1 SEBORRHEIC KERATOSIS: ICD-10-CM

## 2020-01-15 DIAGNOSIS — Z86.018 HISTORY OF DYSPLASTIC NEVUS: ICD-10-CM

## 2020-01-15 DIAGNOSIS — L81.4 SOLAR LENTIGINOSIS: Primary | ICD-10-CM

## 2020-01-15 PROCEDURE — 99213 OFFICE O/P EST LOW 20 MIN: CPT | Performed by: FAMILY MEDICINE

## 2020-01-15 NOTE — LETTER
1/15/2020         RE: Jose Henderson  [address] [address]        Dear Colleague,    Thank you for referring your patient, Jose Henderson, to the [facility] [place]. Please see a copy of my visit note below.    [facility] - PRIMARY CARE SKIN    CC: skin cancer screening (full-body)  SUBJECTIVE:   Jose Henderson is a(n) 64 year old male who presents to clinic today for a full-body skin exam history of moderate-severe atypical nevus, s/p wide excision.     Bothersome lesions noticed by the patient or other skin concerns :  Issue One: no particular concerns    Personal Medical History  Skin cancer: YES - squamous cell carcinoma, other skin cancers on face, moderate- severe atypical nevus on the back.Mild and moderate atypical nevi.    Family Medical History  Skin cancer: NO    Sun Exposure History  Previous history of significant sun exposure:  Blistering sunburns: YES - 1-2 times.  Tanning beds: YES - when younger.  Sunscreen usage: YES.    Occupation: retired,  (both indoor & outdoor).    Refer to electronic medical record (EMR) for past medical history and medications.    INTEGUMENTARY/SKIN: POSITIVE for changing lesion  ROS: 14 point review of systems was negative except the symptoms listed above in the HPI.        OBJECTIVE:   GENERAL: healthy, alert and no distress.  LYMPH: no cervical, supraclavicular, axillary, or inguinal adenopathy  SKIN: Betts Skin Type - II.  This patient was examined from the top of the head to the bottom of the feet  including scalp, face, neck, trunk, buttocks, both arms, both legs, both hands, both feet, and all fingers and toes. The dermatoscope was used to help evaluate pigmented lesions.  Skin Pertinent Findings:  Scalp, vertex: Two 4 mm in size smooth flesh-colored flat-topped benign-appearing lesion.    Upper extremities, Trunk: Multiple brown macules of various sizes and shapes most consistent with (benign)  melanocytic nevi and [id number], coarse-textured, brown, raised lesion(s) most consistent with seborrheic keratosis    Left superior shoulder- well healed scar  Abdomen: slightly raised, red lesion(s) consistent with capillary hemangioma. stuck-on appearing papules, raised, brown, coarse-textured, round lesion(s) most consistent with seborrheic keratoses.  Feet : some scaling      ASSESSMENT:     Encounter Diagnoses   Name Primary?     Solar lentiginosis Yes     Seborrheic keratosis      History of dysplastic nevus      History of skin cancer          PLAN:   Patient Instructions   Cerave Healing ointment - blue tube - use for dry feet    Skin exam in one year      The patient was counseled about sunscreens and sun avoidance. The patient was counseled to check the skin regularly and report any lesion that is new, changing, itching, scabbing, bleeding or otherwise bothersome. The patient was discharged ambulatory and in stable condition.    TT: 25 minutes.  CT: 15 minutes.      Again, thank you for allowing me to participate in the care of your patient.        Sincerely,        Katrina Husain MD

## 2021-12-14 ENCOUNTER — OFFICE VISIT (OUTPATIENT)
Dept: FAMILY MEDICINE | Facility: CLINIC | Age: 66
End: 2021-12-14
Payer: MEDICARE

## 2021-12-14 VITALS — SYSTOLIC BLOOD PRESSURE: 126 MMHG | DIASTOLIC BLOOD PRESSURE: 62 MMHG

## 2021-12-14 DIAGNOSIS — L91.8 INFLAMED ACROCHORDON: ICD-10-CM

## 2021-12-14 DIAGNOSIS — D23.9 DERMATOFIBROMA: ICD-10-CM

## 2021-12-14 DIAGNOSIS — D18.01 CHERRY ANGIOMA: Primary | ICD-10-CM

## 2021-12-14 DIAGNOSIS — Z12.83 SKIN CANCER SCREENING: ICD-10-CM

## 2021-12-14 DIAGNOSIS — Z85.828 HISTORY OF SKIN CANCER: ICD-10-CM

## 2021-12-14 DIAGNOSIS — Z86.018 HISTORY OF DYSPLASTIC NEVUS: ICD-10-CM

## 2021-12-14 DIAGNOSIS — L82.1 SEBORRHEIC KERATOSIS: ICD-10-CM

## 2021-12-14 PROCEDURE — 99213 OFFICE O/P EST LOW 20 MIN: CPT | Mod: 25 | Performed by: PHYSICIAN ASSISTANT

## 2021-12-14 PROCEDURE — 11200 RMVL SKIN TAGS UP TO&INC 15: CPT | Performed by: PHYSICIAN ASSISTANT

## 2021-12-14 RX ORDER — WARFARIN SODIUM 5 MG/1
TABLET ORAL
COMMUNITY
Start: 2021-10-15

## 2021-12-14 RX ORDER — EMPAGLIFLOZIN 10 MG/1
TABLET, FILM COATED ORAL
COMMUNITY
Start: 2021-11-23

## 2021-12-14 NOTE — PATIENT INSTRUCTIONS
Cryotherapy    What is it?    Use of a very cold liquid, such as liquid nitrogen, to freeze and destroy abnormal skin cells that need to be removed    What should I expect?    Tenderness and redness    A small blister that might grow and fill with dark purple blood. There may be crusting.    More than one treatment may be needed if the lesions do not go away.    How do I care for the treated area?    Gently wash the area with your hands when bathing.    Use a thin layer of Vaseline to help with healing. You may use a Band-Aid.     The area should heal within 7-10 days and may leave behind a pink or lighter color.     Do not use an antibiotic or Neosporin ointment.     You may take acetaminophen (Tylenol) for pain.     Call your doctor if you have:    Severe pain    Signs of infection (warmth, redness, cloudy yellow drainage, and or a bad smell)    Questions or concerns    Who should I call with questions?       [facility]: [phone]       [facility]: [phone]       For urgent needs outside of business hours call the [facility] at [phone] and ask for the dermatology resident on call

## 2021-12-14 NOTE — PROGRESS NOTES
[facility] Dermatology Note  Encounter Date: Dec 14, 2021  Office Visit     Dermatology Problem List:  FBSE 12/14/2021   1. Hx NMSC  - SCC  2. Hx of dysplastic nevus  3. Inflamed acrochordon, left upper eyelid  - s/p cryo 12/14/2021  ____________________________________________    Assessment & Plan:    #. Inflamed acrochordon, left upper eyelid  - Cryotherapy performed today. See procedure note below.     #. History of NMSC. SCC. No evidence of recurrent disease.  - Continue photoprotection - recommend SPF 30 or higher with frequent reapplication  - Continue yearly skin exams  - Advised to monitor for changing, non-healing, bleeding, painful, changing, or otherwise symptomatic lesions    #. History of dysplastic nevi  - ABCDEs of melanoma advised  - Continue photoprotection  - Continue yearly skin exams  - Advised to monitor for changing, non-healing, bleeding, painful, changing, or otherwise symptomatic lesions     #. Benign lesions: Seborrheic keratoses, cherry angiomas, dermatofibroma. Explained to patient benign nature of lesion. No treatment is necessary at this time unless the lesion changes or becomes symptomatic.   - ABCDs of melanoma were discussed and self skin checks were advised.  - Sun precaution was advised including the use of sun screens of SPF 30 or higher, sun protective clothing, and avoidance of tanning beds.    Procedures Performed:   - Cryotherapy procedure note, location(s): left upper eyelid. After verbal consent and discussion of risks and benefits including, but not limited to, dyspigmentation/scar, blister, and pain, 1 lesion(s) was(were) treated with 1-2 mm freeze border for 1-2 cycles with liquid nitrogen. Post cryotherapy instructions were provided.    Follow-up: 1 year(s) in-person, or earlier for new or changing lesions    Staff and Scribe:     Scribe Disclosure:  Dulce CORNELL, am serving as a scribe to document services personally performed by Aneta Arroyo  JERSON Winters based on data collection and the provider's statements to me.     Provider Disclosure:   The documentation recorded by the scribe accurately reflects the services I personally performed and the decisions made by me.    All risks, benefits and alternatives were discussed with patient.  Patient is in agreement and understands the assessment and plan.  All questions were answered.  Sun Screen Education was given.   Return to Clinic annually or sooner as needed.   Aneta Winters PA-C   [facility]     ____________________________________________    CC: Skin Check      HPI:  Mr. Jose Henderson is a(n) 66 year old male who presents today as a return patient for FBSE.    Today, the patient reports a rough spot on his left temple that he would like evaluated. He also notes a skin tag on his left eye lid that has been bothersome and he would like this to be treated today.    Patient is otherwise feeling well, without additional skin concerns.    Labs Reviewed:  N/A    Physical Exam:  Vitals: /62   SKIN: Full skin, which includes the head/face, both arms, chest, back, abdomen,both legs, genitalia and/or groin buttocks, digits and/or nails, was examined.  - There is(are) skin colored pedunculated papules on the left upper eyelid with surrounding erythema.  - There are waxy stuck on tan to brown papules on the trunk and extremities.   - There are dome shaped bright red papules on the trunk and extremities.   - There is a firm tan/flesh colored papule that dimples with lateral pressure on the extremities.   - No other lesions of concern on areas examined.     Medications:  Current Outpatient Medications   Medication     atorvastatin (LIPITOR) 80 MG tablet     CIPRODEX 0.3-0.1 % otic suspension     glimepiride (AMARYL) 4 MG tablet     JARDIANCE 10 MG TABS tablet     lisinopril (PRINIVIL/ZESTRIL) 10 MG tablet     omeprazole (PRILOSEC) 20 MG DR capsule     sertraline  (ZOLOFT) 100 MG tablet     sildenafil (VIAGRA) 100 MG tablet     warfarin ANTICOAGULANT (COUMADIN) 5 MG tablet     gabapentin (NEURONTIN) 300 MG capsule     No current facility-administered medications for this visit.        Past Medical History:   Patient Active Problem List   Diagnosis     History of skin cancer     History of dysplastic nevus     History reviewed. No pertinent past medical history.     CC Referred Self, MD  No address on file on close of this encounter.

## 2021-12-14 NOTE — LETTER
12/14/2021         RE: Jose Henderson  [address] [place] [facility] [id number]        Dear Colleague,    Thank you for referring your patient, Jose Henderson, to the [facility]. Please see a copy of my visit note below.    [facility] Dermatology Note  Encounter Date: Dec 14, 2021  Office Visit     Dermatology Problem List:  FBSE 12/14/2021   1. Hx NMSC  - SCC  2. Hx of dysplastic nevus  3. Inflamed acrochordon, left upper eyelid  - s/p cryo 12/14/2021  ____________________________________________    Assessment & Plan:    #. Inflamed acrochordon, left upper eyelid  - Cryotherapy performed today. See procedure note below.     #. History of NMSC. SCC. No evidence of recurrent disease.  - Continue photoprotection - recommend SPF 30 or higher with frequent reapplication  - Continue yearly skin exams  - Advised to monitor for changing, non-healing, bleeding, painful, changing, or otherwise symptomatic lesions    #. History of dysplastic nevi  - ABCDEs of melanoma advised  - Continue photoprotection  - Continue yearly skin exams  - Advised to monitor for changing, non-healing, bleeding, painful, changing, or otherwise symptomatic lesions     #. Benign lesions: Seborrheic keratoses, cherry angiomas, dermatofibroma. Explained to patient benign nature of lesion. No treatment is necessary at this time unless the lesion changes or becomes symptomatic.   - ABCDs of melanoma were discussed and self skin checks were advised.  - Sun precaution was advised including the use of sun screens of SPF 30 or higher, sun protective clothing, and avoidance of tanning beds.    Procedures Performed:   - Cryotherapy procedure note, location(s): left upper eyelid. After verbal consent and discussion of risks and benefits including, but not limited to, dyspigmentation/scar, blister, and pain, 1 lesion(s) was(were) treated with 1-2 mm freeze border for 1-2 cycles with liquid  nitrogen. Post cryotherapy instructions were provided.    Follow-up: 1 year(s) in-person, or earlier for new or changing lesions    Staff and Scribe:     Scribe Disclosure:  I, Dulce Degroot, am serving as a scribe to document services personally performed by Aneta Winters PA-C based on data collection and the provider's statements to me.     Provider Disclosure:   The documentation recorded by the scribe accurately reflects the services I personally performed and the decisions made by me.    All risks, benefits and alternatives were discussed with patient.  Patient is in agreement and understands the assessment and plan.  All questions were answered.  Sun Screen Education was given.   Return to Clinic annually or sooner as needed.   Aneta Winters PA-C   [facility]     ____________________________________________    CC: Skin Check      HPI:  Mr. Jose Henderson is a(n) 66 year old male who presents today as a return patient for FBSE.    Today, the patient reports a rough spot on his left temple that he would like evaluated. He also notes a skin tag on his left eye lid that has been bothersome and he would like this to be treated today.    Patient is otherwise feeling well, without additional skin concerns.    Labs Reviewed:  N/A    Physical Exam:  Vitals: /62   SKIN: Full skin, which includes the head/face, both arms, chest, back, abdomen,both legs, genitalia and/or groin buttocks, digits and/or nails, was examined.  - There is(are) skin colored pedunculated papules on the left upper eyelid with surrounding erythema.  - There are waxy stuck on tan to brown papules on the trunk and extremities.   - There are dome shaped bright red papules on the trunk and extremities.   - There is a firm tan/flesh colored papule that dimples with lateral pressure on the extremities.   - No other lesions of concern on areas examined.     Medications:  Current Outpatient Medications    Medication     atorvastatin (LIPITOR) 80 MG tablet     CIPRODEX 0.3-0.1 % otic suspension     glimepiride (AMARYL) 4 MG tablet     JARDIANCE 10 MG TABS tablet     lisinopril (PRINIVIL/ZESTRIL) 10 MG tablet     omeprazole (PRILOSEC) 20 MG DR capsule     sertraline (ZOLOFT) 100 MG tablet     sildenafil (VIAGRA) 100 MG tablet     warfarin ANTICOAGULANT (COUMADIN) 5 MG tablet     gabapentin (NEURONTIN) 300 MG capsule     No current facility-administered medications for this visit.        Past Medical History:   Patient Active Problem List   Diagnosis     History of skin cancer     History of dysplastic nevus     History reviewed. No pertinent past medical history.     CC Referred Self, MD  No address on file on close of this encounter.      Again, thank you for allowing me to participate in the care of your patient.        Sincerely,        Aneta Winters PA-C